# Patient Record
Sex: MALE | Race: WHITE | Employment: OTHER | URBAN - METROPOLITAN AREA
[De-identification: names, ages, dates, MRNs, and addresses within clinical notes are randomized per-mention and may not be internally consistent; named-entity substitution may affect disease eponyms.]

---

## 2017-01-01 ENCOUNTER — HOSPITAL ENCOUNTER (OUTPATIENT)
Dept: LAB | Age: 82
Discharge: HOME | End: 2017-07-05
Payer: MEDICARE

## 2017-01-01 ENCOUNTER — ANESTHESIA EVENT (OUTPATIENT)
Dept: SURGERY | Age: 82
DRG: 266 | End: 2017-01-01
Payer: MEDICARE

## 2017-01-01 ENCOUNTER — HOSPITAL ENCOUNTER (OUTPATIENT)
Dept: LAB | Age: 82
Discharge: HOME | End: 2017-04-20
Attending: INTERNAL MEDICINE
Payer: MEDICARE

## 2017-01-01 ENCOUNTER — PATIENT OUTREACH (OUTPATIENT)
Dept: CASE MANAGEMENT | Age: 82
End: 2017-01-01

## 2017-01-01 ENCOUNTER — HOSPITAL ENCOUNTER (INPATIENT)
Age: 82
LOS: 3 days | Discharge: HOME | DRG: 266 | End: 2017-06-29
Attending: INTERNAL MEDICINE | Admitting: INTERNAL MEDICINE
Payer: MEDICARE

## 2017-01-01 ENCOUNTER — HOSPITAL ENCOUNTER (OUTPATIENT)
Age: 82
Setting detail: OBSERVATION
Discharge: HOME | DRG: 307 | End: 2017-05-04
Attending: INTERNAL MEDICINE | Admitting: INTERNAL MEDICINE
Payer: MEDICARE

## 2017-01-01 ENCOUNTER — HOSPITAL ENCOUNTER (OUTPATIENT)
Dept: CARDIAC CATH/INVASIVE PROCEDURES | Age: 82
Discharge: HOME | End: 2017-02-07
Attending: INTERNAL MEDICINE | Admitting: INTERNAL MEDICINE
Payer: MEDICARE

## 2017-01-01 ENCOUNTER — ANESTHESIA (OUTPATIENT)
Dept: SURGERY | Age: 82
DRG: 266 | End: 2017-01-01
Payer: MEDICARE

## 2017-01-01 ENCOUNTER — HOSPITAL ENCOUNTER (OUTPATIENT)
Dept: LAB | Age: 82
Discharge: HOME | End: 2017-07-26
Payer: MEDICARE

## 2017-01-01 ENCOUNTER — APPOINTMENT (OUTPATIENT)
Dept: ULTRASOUND IMAGING | Age: 82
DRG: 266 | End: 2017-01-01
Attending: INTERNAL MEDICINE
Payer: MEDICARE

## 2017-01-01 ENCOUNTER — APPOINTMENT (OUTPATIENT)
Dept: GENERAL RADIOLOGY | Age: 82
DRG: 266 | End: 2017-01-01
Payer: MEDICARE

## 2017-01-01 VITALS
HEART RATE: 89 BPM | BODY MASS INDEX: 28.72 KG/M2 | HEIGHT: 67 IN | DIASTOLIC BLOOD PRESSURE: 111 MMHG | TEMPERATURE: 97.5 F | SYSTOLIC BLOOD PRESSURE: 156 MMHG | WEIGHT: 183 LBS | RESPIRATION RATE: 16 BRPM | OXYGEN SATURATION: 98 %

## 2017-01-01 VITALS
BODY MASS INDEX: 31.15 KG/M2 | RESPIRATION RATE: 16 BRPM | DIASTOLIC BLOOD PRESSURE: 69 MMHG | HEART RATE: 65 BPM | WEIGHT: 198.85 LBS | SYSTOLIC BLOOD PRESSURE: 123 MMHG | TEMPERATURE: 98.1 F | OXYGEN SATURATION: 93 %

## 2017-01-01 VITALS
WEIGHT: 187.9 LBS | DIASTOLIC BLOOD PRESSURE: 90 MMHG | OXYGEN SATURATION: 96 % | TEMPERATURE: 97 F | RESPIRATION RATE: 18 BRPM | SYSTOLIC BLOOD PRESSURE: 164 MMHG | HEIGHT: 68 IN | HEART RATE: 59 BPM | BODY MASS INDEX: 28.48 KG/M2

## 2017-01-01 DIAGNOSIS — I35.0 AORTIC VALVE STENOSIS, UNSPECIFIED ETIOLOGY: ICD-10-CM

## 2017-01-01 DIAGNOSIS — I35.0 AORTIC VALVE STENOSIS, UNSPECIFIED ETIOLOGY: Primary | ICD-10-CM

## 2017-01-01 DIAGNOSIS — I25.10 ATHEROSCLEROSIS OF NATIVE CORONARY ARTERY OF NATIVE HEART WITHOUT ANGINA PECTORIS: ICD-10-CM

## 2017-01-01 LAB
ABO + RH BLD: NORMAL
ALBUMIN SERPL BCP-MCNC: 3.1 G/DL (ref 3.2–4.6)
ALBUMIN SERPL BCP-MCNC: 3.5 G/DL (ref 3.2–4.6)
ALBUMIN SERPL BCP-MCNC: 3.6 G/DL (ref 3.2–4.6)
ALBUMIN/GLOB SERPL: 0.9 {RATIO} (ref 1.2–3.5)
ALBUMIN/GLOB SERPL: 1 {RATIO} (ref 1.2–3.5)
ALP SERPL-CCNC: 89 U/L (ref 50–136)
ALP SERPL-CCNC: 96 U/L (ref 50–136)
ALT SERPL-CCNC: 26 U/L (ref 12–65)
ALT SERPL-CCNC: 30 U/L (ref 12–65)
ANION GAP BLD CALC-SCNC: 11 MMOL/L (ref 7–16)
ANION GAP BLD CALC-SCNC: 11 MMOL/L (ref 7–16)
ANION GAP BLD CALC-SCNC: 12 MMOL/L (ref 7–16)
ANION GAP BLD CALC-SCNC: 6 MMOL/L
ANION GAP BLD CALC-SCNC: 6 MMOL/L (ref 7–16)
ANION GAP BLD CALC-SCNC: 7 MMOL/L
ANION GAP BLD CALC-SCNC: 7 MMOL/L
ANION GAP BLD CALC-SCNC: 8 MMOL/L (ref 7–16)
ANION GAP BLD CALC-SCNC: 9 MMOL/L (ref 7–16)
APPEARANCE UR: CLEAR
AST SERPL W P-5'-P-CCNC: 18 U/L (ref 15–37)
AST SERPL W P-5'-P-CCNC: 21 U/L (ref 15–37)
ATRIAL RATE: 100 BPM
ATRIAL RATE: 144 BPM
ATRIAL RATE: 60 BPM
ATRIAL RATE: 61 BPM
ATRIAL RATE: 78 BPM
BACTERIA SPEC CULT: NORMAL
BACTERIA SPEC CULT: NORMAL
BACTERIA URNS QL MICRO: 0 /HPF
BASE DEFICIT BLD-SCNC: 3 MMOL/L
BASE DEFICIT BLD-SCNC: 4 MMOL/L
BASE EXCESS BLD CALC-SCNC: 0 MMOL/L
BASOPHILS # BLD AUTO: 0 K/UL (ref 0–0.2)
BASOPHILS # BLD AUTO: 0.1 K/UL (ref 0–0.2)
BASOPHILS # BLD: 1 % (ref 0–2)
BASOPHILS # BLD: 1 % (ref 0–2)
BILIRUB SERPL-MCNC: 0.6 MG/DL (ref 0.2–1.1)
BILIRUB UR QL: NEGATIVE
BLD PROD TYP BPU: NORMAL
BLOOD GROUP ANTIBODIES SERPL: NORMAL
BNP SERPL-MCNC: 288 PG/ML
BPU ID: NORMAL
BUN SERPL-MCNC: 29 MG/DL (ref 8–23)
BUN SERPL-MCNC: 30 MG/DL (ref 8–23)
BUN SERPL-MCNC: 32 MG/DL (ref 8–23)
BUN SERPL-MCNC: 35 MG/DL (ref 8–23)
BUN SERPL-MCNC: 36 MG/DL (ref 8–23)
BUN SERPL-MCNC: 36 MG/DL (ref 8–23)
BUN SERPL-MCNC: 37 MG/DL (ref 8–23)
BUN SERPL-MCNC: 40 MG/DL (ref 8–23)
BUN SERPL-MCNC: 43 MG/DL (ref 8–23)
CA-I BLD-MCNC: 1.14 MMOL/L (ref 1.12–1.32)
CA-I BLD-MCNC: 1.14 MMOL/L (ref 1.12–1.32)
CA-I BLD-MCNC: 1.15 MMOL/L (ref 1.12–1.32)
CALCIUM SERPL-MCNC: 8 MG/DL (ref 8.3–10.4)
CALCIUM SERPL-MCNC: 8.1 MG/DL (ref 8.3–10.4)
CALCIUM SERPL-MCNC: 8.2 MG/DL (ref 8.3–10.4)
CALCIUM SERPL-MCNC: 8.4 MG/DL (ref 8.3–10.4)
CALCIUM SERPL-MCNC: 8.5 MG/DL (ref 8.3–10.4)
CALCIUM SERPL-MCNC: 8.5 MG/DL (ref 8.3–10.4)
CALCIUM SERPL-MCNC: 8.6 MG/DL (ref 8.3–10.4)
CALCIUM SERPL-MCNC: 8.6 MG/DL (ref 8.3–10.4)
CALCIUM SERPL-MCNC: 8.8 MG/DL (ref 8.3–10.4)
CALCIUM SERPL-MCNC: 8.9 MG/DL (ref 8.3–10.4)
CALCIUM SERPL-MCNC: 9.1 MG/DL (ref 8.3–10.4)
CALCULATED P AXIS, ECG09: 15 DEGREES
CALCULATED P AXIS, ECG09: 23 DEGREES
CALCULATED P AXIS, ECG09: 43 DEGREES
CALCULATED P AXIS, ECG09: 86 DEGREES
CALCULATED R AXIS, ECG10: -53 DEGREES
CALCULATED R AXIS, ECG10: -55 DEGREES
CALCULATED R AXIS, ECG10: -57 DEGREES
CALCULATED R AXIS, ECG10: -60 DEGREES
CALCULATED R AXIS, ECG10: 3 DEGREES
CALCULATED T AXIS, ECG11: -114 DEGREES
CALCULATED T AXIS, ECG11: -2 DEGREES
CALCULATED T AXIS, ECG11: 43 DEGREES
CALCULATED T AXIS, ECG11: 65 DEGREES
CALCULATED T AXIS, ECG11: 71 DEGREES
CHLORIDE SERPL-SCNC: 103 MMOL/L (ref 98–107)
CHLORIDE SERPL-SCNC: 104 MMOL/L (ref 98–107)
CHLORIDE SERPL-SCNC: 105 MMOL/L (ref 98–107)
CHLORIDE SERPL-SCNC: 106 MMOL/L (ref 98–107)
CHLORIDE SERPL-SCNC: 106 MMOL/L (ref 98–107)
CHLORIDE SERPL-SCNC: 107 MMOL/L (ref 98–107)
CHLORIDE SERPL-SCNC: 107 MMOL/L (ref 98–107)
CHLORIDE SERPL-SCNC: 108 MMOL/L (ref 98–107)
CHLORIDE SERPL-SCNC: 108 MMOL/L (ref 98–107)
CHLORIDE SERPL-SCNC: 109 MMOL/L (ref 98–107)
CHLORIDE SERPL-SCNC: 110 MMOL/L (ref 98–107)
CO2 SERPL-SCNC: 23 MMOL/L (ref 21–32)
CO2 SERPL-SCNC: 24 MMOL/L (ref 21–32)
CO2 SERPL-SCNC: 25 MMOL/L (ref 21–32)
CO2 SERPL-SCNC: 25 MMOL/L (ref 21–32)
CO2 SERPL-SCNC: 27 MMOL/L (ref 21–32)
CO2 SERPL-SCNC: 27 MMOL/L (ref 21–32)
CO2 SERPL-SCNC: 28 MMOL/L (ref 21–32)
CO2 SERPL-SCNC: 28 MMOL/L (ref 21–32)
CO2 SERPL-SCNC: 29 MMOL/L (ref 21–32)
CO2 SERPL-SCNC: 29 MMOL/L (ref 21–32)
CO2 SERPL-SCNC: 30 MMOL/L (ref 21–32)
COLOR UR: YELLOW
CREAT SERPL-MCNC: 2.36 MG/DL (ref 0.8–1.5)
CREAT SERPL-MCNC: 2.43 MG/DL (ref 0.8–1.5)
CREAT SERPL-MCNC: 2.5 MG/DL (ref 0.8–1.5)
CREAT SERPL-MCNC: 2.61 MG/DL (ref 0.8–1.5)
CREAT SERPL-MCNC: 2.61 MG/DL (ref 0.8–1.5)
CREAT SERPL-MCNC: 2.64 MG/DL (ref 0.8–1.5)
CREAT SERPL-MCNC: 2.64 MG/DL (ref 0.8–1.5)
CREAT SERPL-MCNC: 2.66 MG/DL (ref 0.8–1.5)
CREAT SERPL-MCNC: 2.7 MG/DL (ref 0.8–1.5)
CREAT SERPL-MCNC: 2.8 MG/DL (ref 0.8–1.5)
CREAT SERPL-MCNC: 2.83 MG/DL (ref 0.8–1.5)
CROSSMATCH RESULT,%XM: NORMAL
DIAGNOSIS, 93000: NORMAL
DIASTOLIC BP, ECG02: NORMAL MMHG
DIASTOLIC BP, ECG02: NORMAL MMHG
DIFFERENTIAL METHOD BLD: ABNORMAL
DIFFERENTIAL METHOD BLD: ABNORMAL
EOSINOPHIL # BLD: 0 K/UL (ref 0–0.8)
EOSINOPHIL # BLD: 0.1 K/UL (ref 0–0.8)
EOSINOPHIL NFR BLD: 1 % (ref 0.5–7.8)
EOSINOPHIL NFR BLD: 1 % (ref 0.5–7.8)
ERYTHROCYTE [DISTWIDTH] IN BLOOD BY AUTOMATED COUNT: 14.6 % (ref 11.9–14.6)
ERYTHROCYTE [DISTWIDTH] IN BLOOD BY AUTOMATED COUNT: 14.9 % (ref 11.9–14.6)
ERYTHROCYTE [DISTWIDTH] IN BLOOD BY AUTOMATED COUNT: 15.1 % (ref 11.9–14.6)
ERYTHROCYTE [DISTWIDTH] IN BLOOD BY AUTOMATED COUNT: 15.3 % (ref 11.9–14.6)
ERYTHROCYTE [DISTWIDTH] IN BLOOD BY AUTOMATED COUNT: 15.3 % (ref 11.9–14.6)
ERYTHROCYTE [DISTWIDTH] IN BLOOD BY AUTOMATED COUNT: 15.4 % (ref 11.9–14.6)
ERYTHROCYTE [DISTWIDTH] IN BLOOD BY AUTOMATED COUNT: 15.5 % (ref 11.9–14.6)
ERYTHROCYTE [DISTWIDTH] IN BLOOD BY AUTOMATED COUNT: 16.1 % (ref 11.9–14.6)
EST. AVERAGE GLUCOSE BLD GHB EST-MCNC: 103 MG/DL
GLOBULIN SER CALC-MCNC: 3.4 G/DL (ref 2.3–3.5)
GLOBULIN SER CALC-MCNC: 3.7 G/DL (ref 2.3–3.5)
GLUCOSE BLD STRIP.AUTO-MCNC: 106 MG/DL (ref 70–105)
GLUCOSE BLD STRIP.AUTO-MCNC: 114 MG/DL (ref 70–105)
GLUCOSE BLD STRIP.AUTO-MCNC: 119 MG/DL (ref 70–105)
GLUCOSE SERPL-MCNC: 106 MG/DL (ref 65–100)
GLUCOSE SERPL-MCNC: 110 MG/DL (ref 65–100)
GLUCOSE SERPL-MCNC: 116 MG/DL (ref 65–100)
GLUCOSE SERPL-MCNC: 89 MG/DL (ref 65–100)
GLUCOSE SERPL-MCNC: 89 MG/DL (ref 65–100)
GLUCOSE SERPL-MCNC: 91 MG/DL (ref 65–100)
GLUCOSE SERPL-MCNC: 92 MG/DL (ref 65–100)
GLUCOSE SERPL-MCNC: 92 MG/DL (ref 65–100)
GLUCOSE SERPL-MCNC: 93 MG/DL (ref 65–100)
GLUCOSE SERPL-MCNC: 97 MG/DL (ref 65–100)
GLUCOSE SERPL-MCNC: 99 MG/DL (ref 65–100)
GLUCOSE UR STRIP.AUTO-MCNC: NEGATIVE MG/DL
HBA1C MFR BLD: 5.2 % (ref 4.8–6)
HCO3 BLD-SCNC: 22.2 MMOL/L (ref 22–26)
HCO3 BLD-SCNC: 22.9 MMOL/L (ref 22–26)
HCO3 BLD-SCNC: 24.2 MMOL/L (ref 22–26)
HCT VFR BLD AUTO: 32 % (ref 41.1–50.3)
HCT VFR BLD AUTO: 34 % (ref 41.1–50.3)
HCT VFR BLD AUTO: 34.3 % (ref 41.1–50.3)
HCT VFR BLD AUTO: 35.7 % (ref 41.1–50.3)
HCT VFR BLD AUTO: 36.3 % (ref 41.1–50.3)
HCT VFR BLD AUTO: 37.1 % (ref 41.1–50.3)
HCT VFR BLD AUTO: 37.2 % (ref 41.1–50.3)
HCT VFR BLD AUTO: 41.9 % (ref 41.1–50.3)
HGB BLD-MCNC: 10.9 G/DL (ref 13.6–17.2)
HGB BLD-MCNC: 11.3 G/DL (ref 13.6–17.2)
HGB BLD-MCNC: 11.8 G/DL (ref 13.6–17.2)
HGB BLD-MCNC: 12 G/DL (ref 13.6–17.2)
HGB BLD-MCNC: 12.1 G/DL (ref 13.6–17.2)
HGB BLD-MCNC: 12.5 G/DL (ref 13.6–17.2)
HGB BLD-MCNC: 12.5 G/DL (ref 13.6–17.2)
HGB BLD-MCNC: 14.2 G/DL (ref 13.6–17.2)
HGB UR QL STRIP: NEGATIVE
IMM GRANULOCYTES # BLD: 0.1 K/UL (ref 0–0.5)
IMM GRANULOCYTES NFR BLD AUTO: 1.1 % (ref 0–5)
INR PPP: 1.1 (ref 0.9–1.2)
INR PPP: 1.4 (ref 0.9–1.2)
INR PPP: 1.7 (ref 0.9–1.2)
KETONES UR QL STRIP.AUTO: NEGATIVE MG/DL
LEUKOCYTE ESTERASE UR QL STRIP.AUTO: NEGATIVE
LYMPHOCYTES # BLD AUTO: 22 % (ref 13–44)
LYMPHOCYTES # BLD AUTO: 27 % (ref 13–44)
LYMPHOCYTES # BLD: 1.3 K/UL (ref 0.5–4.6)
LYMPHOCYTES # BLD: 1.4 K/UL (ref 0.5–4.6)
MAGNESIUM SERPL-MCNC: 2.3 MG/DL (ref 1.8–2.4)
MAGNESIUM SERPL-MCNC: 2.4 MG/DL (ref 1.8–2.4)
MCH RBC QN AUTO: 31.3 PG (ref 26.1–32.9)
MCH RBC QN AUTO: 31.8 PG (ref 26.1–32.9)
MCH RBC QN AUTO: 31.9 PG (ref 26.1–32.9)
MCH RBC QN AUTO: 32.3 PG (ref 26.1–32.9)
MCH RBC QN AUTO: 32.4 PG (ref 26.1–32.9)
MCH RBC QN AUTO: 32.5 PG (ref 26.1–32.9)
MCH RBC QN AUTO: 32.6 PG (ref 26.1–32.9)
MCH RBC QN AUTO: 33.1 PG (ref 26.1–32.9)
MCHC RBC AUTO-ENTMCNC: 32.9 G/DL (ref 31.4–35)
MCHC RBC AUTO-ENTMCNC: 33.3 G/DL (ref 31.4–35)
MCHC RBC AUTO-ENTMCNC: 33.6 G/DL (ref 31.4–35)
MCHC RBC AUTO-ENTMCNC: 33.6 G/DL (ref 31.4–35)
MCHC RBC AUTO-ENTMCNC: 33.7 G/DL (ref 31.4–35)
MCHC RBC AUTO-ENTMCNC: 33.9 G/DL (ref 31.4–35)
MCHC RBC AUTO-ENTMCNC: 34.1 G/DL (ref 31.4–35)
MCHC RBC AUTO-ENTMCNC: 34.7 G/DL (ref 31.4–35)
MCV RBC AUTO: 93.4 FL (ref 79.6–97.8)
MCV RBC AUTO: 94.6 FL (ref 79.6–97.8)
MCV RBC AUTO: 95 FL (ref 79.6–97.8)
MCV RBC AUTO: 95.5 FL (ref 79.6–97.8)
MCV RBC AUTO: 95.8 FL (ref 79.6–97.8)
MCV RBC AUTO: 95.9 FL (ref 79.6–97.8)
MCV RBC AUTO: 96.1 FL (ref 79.6–97.8)
MCV RBC AUTO: 98.6 FL (ref 79.6–97.8)
MONOCYTES # BLD: 0.5 K/UL (ref 0.1–1.3)
MONOCYTES # BLD: 0.8 K/UL (ref 0.1–1.3)
MONOCYTES NFR BLD AUTO: 11 % (ref 4–12)
MONOCYTES NFR BLD AUTO: 13 % (ref 4–12)
NEUTS SEG # BLD: 2.7 K/UL (ref 1.7–8.2)
NEUTS SEG # BLD: 4 K/UL (ref 1.7–8.2)
NEUTS SEG NFR BLD AUTO: 59 % (ref 43–78)
NEUTS SEG NFR BLD AUTO: 63 % (ref 43–78)
NITRITE UR QL STRIP.AUTO: NEGATIVE
P-R INTERVAL, ECG05: 226 MS
P-R INTERVAL, ECG05: 238 MS
P-R INTERVAL, ECG05: NORMAL MS
P-R INTERVAL, ECG05: NORMAL MS
PCO2 BLD: 38.2 MMHG (ref 35–45)
PCO2 BLD: 42 MMHG (ref 35–45)
PCO2 BLD: 45.5 MMHG (ref 35–45)
PH BLD: 7.29 [PH] (ref 7.35–7.45)
PH BLD: 7.34 [PH] (ref 7.35–7.45)
PH BLD: 7.41 [PH] (ref 7.35–7.45)
PH UR STRIP: 5.5 [PH] (ref 5–9)
PLATELET # BLD AUTO: 139 K/UL (ref 150–450)
PLATELET # BLD AUTO: 160 K/UL (ref 150–450)
PLATELET # BLD AUTO: 162 K/UL (ref 150–450)
PLATELET # BLD AUTO: 184 K/UL (ref 150–450)
PLATELET # BLD AUTO: 212 K/UL (ref 150–450)
PLATELET # BLD AUTO: 217 K/UL (ref 150–450)
PLATELET # BLD AUTO: 219 K/UL (ref 150–450)
PLATELET # BLD AUTO: 271 K/UL (ref 150–450)
PLATELET COMMENTS,PCOM: ADEQUATE
PMV BLD AUTO: 10.6 FL (ref 10.8–14.1)
PMV BLD AUTO: 8.8 FL (ref 10.8–14.1)
PMV BLD AUTO: 9.4 FL (ref 10.8–14.1)
PMV BLD AUTO: 9.5 FL (ref 10.8–14.1)
PMV BLD AUTO: 9.5 FL (ref 10.8–14.1)
PMV BLD AUTO: 9.6 FL (ref 10.8–14.1)
PMV BLD AUTO: 9.7 FL (ref 10.8–14.1)
PMV BLD AUTO: 9.7 FL (ref 10.8–14.1)
PO2 BLD: 148 MMHG (ref 80–105)
PO2 BLD: 180 MMHG (ref 80–105)
PO2 BLD: 390 MMHG (ref 80–105)
POTASSIUM BLD-SCNC: 4.4 MMOL/L (ref 3.5–5.1)
POTASSIUM BLD-SCNC: 4.5 MMOL/L (ref 3.5–5.1)
POTASSIUM BLD-SCNC: 4.6 MMOL/L (ref 3.5–5.1)
POTASSIUM SERPL-SCNC: 3.9 MMOL/L (ref 3.5–5.1)
POTASSIUM SERPL-SCNC: 4.1 MMOL/L (ref 3.5–5.1)
POTASSIUM SERPL-SCNC: 4.2 MMOL/L (ref 3.5–5.1)
POTASSIUM SERPL-SCNC: 4.3 MMOL/L (ref 3.5–5.1)
POTASSIUM SERPL-SCNC: 4.4 MMOL/L (ref 3.5–5.1)
POTASSIUM SERPL-SCNC: 4.5 MMOL/L (ref 3.5–5.1)
POTASSIUM SERPL-SCNC: 4.5 MMOL/L (ref 3.5–5.1)
POTASSIUM SERPL-SCNC: 4.6 MMOL/L (ref 3.5–5.1)
POTASSIUM SERPL-SCNC: 4.7 MMOL/L (ref 3.5–5.1)
PROT SERPL-MCNC: 6.5 G/DL (ref 6.3–8.2)
PROT SERPL-MCNC: 7.3 G/DL (ref 6.3–8.2)
PROT UR STRIP-MCNC: NEGATIVE MG/DL
PROTHROMBIN TIME: 11.5 SEC (ref 9.6–12)
PROTHROMBIN TIME: 15 SEC (ref 9.6–12)
PROTHROMBIN TIME: 17.6 SEC (ref 9.6–12)
Q-T INTERVAL, ECG07: 302 MS
Q-T INTERVAL, ECG07: 384 MS
Q-T INTERVAL, ECG07: 490 MS
Q-T INTERVAL, ECG07: 494 MS
Q-T INTERVAL, ECG07: 516 MS
QRS DURATION, ECG06: 114 MS
QRS DURATION, ECG06: 118 MS
QRS DURATION, ECG06: 132 MS
QRS DURATION, ECG06: 138 MS
QRS DURATION, ECG06: 158 MS
QTC CALCULATION (BEZET), ECG08: 437 MS
QTC CALCULATION (BEZET), ECG08: 467 MS
QTC CALCULATION (BEZET), ECG08: 490 MS
QTC CALCULATION (BEZET), ECG08: 497 MS
QTC CALCULATION (BEZET), ECG08: 576 MS
RBC # BLD AUTO: 3.34 M/UL (ref 4.23–5.67)
RBC # BLD AUTO: 3.61 M/UL (ref 4.23–5.67)
RBC # BLD AUTO: 3.62 M/UL (ref 4.23–5.67)
RBC # BLD AUTO: 3.64 M/UL (ref 4.23–5.67)
RBC # BLD AUTO: 3.8 M/UL (ref 4.23–5.67)
RBC # BLD AUTO: 3.87 M/UL (ref 4.23–5.67)
RBC # BLD AUTO: 3.92 M/UL (ref 4.23–5.67)
RBC # BLD AUTO: 4.37 M/UL (ref 4.23–5.67)
RBC MORPH BLD: ABNORMAL
SAO2 % BLD: 100 % (ref 95–98)
SAO2 % BLD: 100 % (ref 95–98)
SAO2 % BLD: 99 % (ref 95–98)
SERVICE CMNT-IMP: NORMAL
SERVICE CMNT-IMP: NORMAL
SODIUM BLD-SCNC: 140 MMOL/L (ref 138–146)
SODIUM BLD-SCNC: 140 MMOL/L (ref 138–146)
SODIUM BLD-SCNC: 141 MMOL/L (ref 138–146)
SODIUM SERPL-SCNC: 139 MMOL/L (ref 136–145)
SODIUM SERPL-SCNC: 140 MMOL/L (ref 136–145)
SODIUM SERPL-SCNC: 140 MMOL/L (ref 136–145)
SODIUM SERPL-SCNC: 142 MMOL/L (ref 136–145)
SODIUM SERPL-SCNC: 143 MMOL/L (ref 136–145)
SODIUM SERPL-SCNC: 144 MMOL/L (ref 136–145)
SODIUM SERPL-SCNC: 145 MMOL/L (ref 136–145)
SP GR UR REFRACTOMETRY: 1.01 (ref 1–1.02)
SPECIMEN EXP DATE BLD: NORMAL
STATUS OF UNIT,%ST: NORMAL
SYSTOLIC BP, ECG01: NORMAL MMHG
SYSTOLIC BP, ECG01: NORMAL MMHG
UNIT DIVISION, %UDIV: 0
UROBILINOGEN UR QL STRIP.AUTO: 0.2 EU/DL (ref 0.2–1)
VENTRICULAR RATE, ECG03: 126 BPM
VENTRICULAR RATE, ECG03: 60 BPM
VENTRICULAR RATE, ECG03: 61 BPM
VENTRICULAR RATE, ECG03: 75 BPM
VENTRICULAR RATE, ECG03: 89 BPM
WBC # BLD AUTO: 4.6 K/UL (ref 4.3–11.1)
WBC # BLD AUTO: 5.3 K/UL (ref 4.3–11.1)
WBC # BLD AUTO: 5.7 K/UL (ref 4.3–11.1)
WBC # BLD AUTO: 5.9 K/UL (ref 4.3–11.1)
WBC # BLD AUTO: 5.9 K/UL (ref 4.3–11.1)
WBC # BLD AUTO: 6.1 K/UL (ref 4.3–11.1)
WBC # BLD AUTO: 6.4 K/UL (ref 4.3–11.1)
WBC # BLD AUTO: 6.7 K/UL (ref 4.3–11.1)
WBC MORPH BLD: ABNORMAL

## 2017-01-01 PROCEDURE — 87086 URINE CULTURE/COLONY COUNT: CPT | Performed by: PHYSICIAN ASSISTANT

## 2017-01-01 PROCEDURE — 36415 COLL VENOUS BLD VENIPUNCTURE: CPT | Performed by: PHYSICIAN ASSISTANT

## 2017-01-01 PROCEDURE — 87641 MR-STAPH DNA AMP PROBE: CPT | Performed by: PHYSICIAN ASSISTANT

## 2017-01-01 PROCEDURE — 74011250637 HC RX REV CODE- 250/637: Performed by: PHYSICIAN ASSISTANT

## 2017-01-01 PROCEDURE — 77030019605

## 2017-01-01 PROCEDURE — 74011250636 HC RX REV CODE- 250/636

## 2017-01-01 PROCEDURE — 02RF38Z REPLACEMENT OF AORTIC VALVE WITH ZOOPLASTIC TISSUE, PERCUTANEOUS APPROACH: ICD-10-PCS | Performed by: INTERNAL MEDICINE

## 2017-01-01 PROCEDURE — 80048 BASIC METABOLIC PNL TOTAL CA: CPT | Performed by: INTERNAL MEDICINE

## 2017-01-01 PROCEDURE — 5A1223Z PERFORMANCE OF CARDIAC PACING, CONTINUOUS: ICD-10-PCS | Performed by: INTERNAL MEDICINE

## 2017-01-01 PROCEDURE — 85610 PROTHROMBIN TIME: CPT | Performed by: INTERNAL MEDICINE

## 2017-01-01 PROCEDURE — 93459 L HRT ART/GRFT ANGIO: CPT

## 2017-01-01 PROCEDURE — 76060000036 HC ANESTHESIA 2.5 TO 3 HR: Performed by: INTERNAL MEDICINE

## 2017-01-01 PROCEDURE — 85025 COMPLETE CBC W/AUTO DIFF WBC: CPT | Performed by: INTERNAL MEDICINE

## 2017-01-01 PROCEDURE — 85027 COMPLETE CBC AUTOMATED: CPT | Performed by: INTERNAL MEDICINE

## 2017-01-01 PROCEDURE — 77030008477 HC STYL SATN SLP COVD -A: Performed by: ANESTHESIOLOGY

## 2017-01-01 PROCEDURE — 77030016564 HC BLD STRNL SAW4 CNMD -B: Performed by: INTERNAL MEDICINE

## 2017-01-01 PROCEDURE — 77030002986 HC SUT PROL J&J -A: Performed by: INTERNAL MEDICINE

## 2017-01-01 PROCEDURE — C1894 INTRO/SHEATH, NON-LASER: HCPCS

## 2017-01-01 PROCEDURE — 74011250636 HC RX REV CODE- 250/636: Performed by: PHYSICIAN ASSISTANT

## 2017-01-01 PROCEDURE — 93005 ELECTROCARDIOGRAM TRACING: CPT | Performed by: INTERNAL MEDICINE

## 2017-01-01 PROCEDURE — 77030011640 HC PAD GRND REM COVD -A: Performed by: INTERNAL MEDICINE

## 2017-01-01 PROCEDURE — 76010000207 HC CV SURG 2.5 TO 3 HR INTENSV-TIER 1: Performed by: INTERNAL MEDICINE

## 2017-01-01 PROCEDURE — 36415 COLL VENOUS BLD VENIPUNCTURE: CPT | Performed by: NURSE PRACTITIONER

## 2017-01-01 PROCEDURE — 77030005537 HC CATH URETH BARD -A: Performed by: INTERNAL MEDICINE

## 2017-01-01 PROCEDURE — 80053 COMPREHEN METABOLIC PANEL: CPT | Performed by: NURSE PRACTITIONER

## 2017-01-01 PROCEDURE — 83735 ASSAY OF MAGNESIUM: CPT | Performed by: PHYSICIAN ASSISTANT

## 2017-01-01 PROCEDURE — 83036 HEMOGLOBIN GLYCOSYLATED A1C: CPT | Performed by: PHYSICIAN ASSISTANT

## 2017-01-01 PROCEDURE — 85027 COMPLETE CBC AUTOMATED: CPT | Performed by: PHYSICIAN ASSISTANT

## 2017-01-01 PROCEDURE — 80048 BASIC METABOLIC PNL TOTAL CA: CPT | Performed by: PHYSICIAN ASSISTANT

## 2017-01-01 PROCEDURE — 82040 ASSAY OF SERUM ALBUMIN: CPT | Performed by: PHYSICIAN ASSISTANT

## 2017-01-01 PROCEDURE — 83880 ASSAY OF NATRIURETIC PEPTIDE: CPT | Performed by: PHYSICIAN ASSISTANT

## 2017-01-01 PROCEDURE — 74011250636 HC RX REV CODE- 250/636: Performed by: NURSE PRACTITIONER

## 2017-01-01 PROCEDURE — 93312 ECHO TRANSESOPHAGEAL: CPT

## 2017-01-01 PROCEDURE — 86923 COMPATIBILITY TEST ELECTRIC: CPT | Performed by: PHYSICIAN ASSISTANT

## 2017-01-01 PROCEDURE — 80053 COMPREHEN METABOLIC PANEL: CPT | Performed by: PHYSICIAN ASSISTANT

## 2017-01-01 PROCEDURE — 77030012221 HC CATH FOL CRITCOR BARD -B: Performed by: INTERNAL MEDICINE

## 2017-01-01 PROCEDURE — 77030004534 HC CATH ANGI DX INFN CARD -A

## 2017-01-01 PROCEDURE — C8929 TTE W OR WO FOL WCON,DOPPLER: HCPCS

## 2017-01-01 PROCEDURE — 80048 BASIC METABOLIC PNL TOTAL CA: CPT | Performed by: NURSE PRACTITIONER

## 2017-01-01 PROCEDURE — 77010033678 HC OXYGEN DAILY

## 2017-01-01 PROCEDURE — 77030013687 HC GD NDL BARD -B

## 2017-01-01 PROCEDURE — 74011000258 HC RX REV CODE- 258

## 2017-01-01 PROCEDURE — 86900 BLOOD TYPING SEROLOGIC ABO: CPT | Performed by: PHYSICIAN ASSISTANT

## 2017-01-01 PROCEDURE — 36415 COLL VENOUS BLD VENIPUNCTURE: CPT | Performed by: INTERNAL MEDICINE

## 2017-01-01 PROCEDURE — 74011250637 HC RX REV CODE- 250/637: Performed by: NURSE PRACTITIONER

## 2017-01-01 PROCEDURE — C1760 CLOSURE DEV, VASC: HCPCS

## 2017-01-01 PROCEDURE — 36600 WITHDRAWAL OF ARTERIAL BLOOD: CPT

## 2017-01-01 PROCEDURE — 85027 COMPLETE CBC AUTOMATED: CPT | Performed by: NURSE PRACTITIONER

## 2017-01-01 PROCEDURE — 85610 PROTHROMBIN TIME: CPT | Performed by: PHYSICIAN ASSISTANT

## 2017-01-01 PROCEDURE — 99153 MOD SED SAME PHYS/QHP EA: CPT

## 2017-01-01 PROCEDURE — C1769 GUIDE WIRE: HCPCS

## 2017-01-01 PROCEDURE — 74011250636 HC RX REV CODE- 250/636: Performed by: INTERNAL MEDICINE

## 2017-01-01 PROCEDURE — 65660000004 HC RM CVT STEPDOWN

## 2017-01-01 PROCEDURE — 81003 URINALYSIS AUTO W/O SCOPE: CPT | Performed by: PHYSICIAN ASSISTANT

## 2017-01-01 PROCEDURE — 77030019940 HC BLNKT HYPOTHRM STRY -B: Performed by: ANESTHESIOLOGY

## 2017-01-01 PROCEDURE — 93005 ELECTROCARDIOGRAM TRACING: CPT | Performed by: NURSE PRACTITIONER

## 2017-01-01 PROCEDURE — 77030004558 HC CATH ANGI DX SUPR TORQ CARD -A

## 2017-01-01 PROCEDURE — 77030031139 HC SUT VCRL2 J&J -A: Performed by: INTERNAL MEDICINE

## 2017-01-01 PROCEDURE — 77030037468 HC VLV AORT EDWRD -L: Performed by: INTERNAL MEDICINE

## 2017-01-01 PROCEDURE — 74011250637 HC RX REV CODE- 250/637: Performed by: INTERNAL MEDICINE

## 2017-01-01 PROCEDURE — 77030004559 HC CATH ANGI DX SUPT CARD -B

## 2017-01-01 PROCEDURE — 82247 BILIRUBIN TOTAL: CPT | Performed by: PHYSICIAN ASSISTANT

## 2017-01-01 PROCEDURE — 74011000250 HC RX REV CODE- 250: Performed by: INTERNAL MEDICINE

## 2017-01-01 PROCEDURE — 74011636320 HC RX REV CODE- 636/320: Performed by: INTERNAL MEDICINE

## 2017-01-01 PROCEDURE — 83735 ASSAY OF MAGNESIUM: CPT | Performed by: INTERNAL MEDICINE

## 2017-01-01 PROCEDURE — 77030013292 HC BOWL MX PRSM J&J -A: Performed by: ANESTHESIOLOGY

## 2017-01-01 PROCEDURE — 93880 EXTRACRANIAL BILAT STUDY: CPT

## 2017-01-01 PROCEDURE — 82803 BLOOD GASES ANY COMBINATION: CPT

## 2017-01-01 PROCEDURE — 65610000006 HC RM INTENSIVE CARE

## 2017-01-01 PROCEDURE — 85025 COMPLETE CBC W/AUTO DIFF WBC: CPT | Performed by: PHYSICIAN ASSISTANT

## 2017-01-01 PROCEDURE — 99218 HC RM OBSERVATION: CPT

## 2017-01-01 PROCEDURE — B24BZZ4 ULTRASONOGRAPHY OF HEART WITH AORTA, TRANSESOPHAGEAL: ICD-10-PCS | Performed by: INTERNAL MEDICINE

## 2017-01-01 PROCEDURE — 94729 DIFFUSING CAPACITY: CPT

## 2017-01-01 PROCEDURE — 74011000250 HC RX REV CODE- 250

## 2017-01-01 PROCEDURE — 99152 MOD SED SAME PHYS/QHP 5/>YRS: CPT

## 2017-01-01 PROCEDURE — 77030020407 HC IV BLD WRMR ST 3M -A: Performed by: ANESTHESIOLOGY

## 2017-01-01 PROCEDURE — 65660000000 HC RM CCU STEPDOWN

## 2017-01-01 PROCEDURE — 77030005401 HC CATH RAD ARRO -A: Performed by: ANESTHESIOLOGY

## 2017-01-01 PROCEDURE — 71010 XR CHEST PORT: CPT

## 2017-01-01 PROCEDURE — C1769 GUIDE WIRE: HCPCS | Performed by: INTERNAL MEDICINE

## 2017-01-01 PROCEDURE — 77030008703 HC TU ET UNCUF COVD -A: Performed by: ANESTHESIOLOGY

## 2017-01-01 PROCEDURE — 77030019908 HC STETH ESOPH SIMS -A: Performed by: ANESTHESIOLOGY

## 2017-01-01 PROCEDURE — 77030018548 HC SUT ETHBND2 J&J -B: Performed by: INTERNAL MEDICINE

## 2017-01-01 PROCEDURE — 93005 ELECTROCARDIOGRAM TRACING: CPT | Performed by: PHYSICIAN ASSISTANT

## 2017-01-01 PROCEDURE — B3101ZZ FLUOROSCOPY OF THORACIC AORTA USING LOW OSMOLAR CONTRAST: ICD-10-PCS | Performed by: INTERNAL MEDICINE

## 2017-01-01 PROCEDURE — 82947 ASSAY GLUCOSE BLOOD QUANT: CPT

## 2017-01-01 PROCEDURE — 77030018836 HC SOL IRR NACL ICUM -A: Performed by: INTERNAL MEDICINE

## 2017-01-01 PROCEDURE — 77030013794 HC KT TRNSDUC BLD EDWD -B: Performed by: ANESTHESIOLOGY

## 2017-01-01 PROCEDURE — 77030005318 HC CATH ELECTRD PACE TMP BARD -C

## 2017-01-01 PROCEDURE — 94010 BREATHING CAPACITY TEST: CPT

## 2017-01-01 PROCEDURE — 65390000012 HC CONDITION CODE 44 OBSERVATION

## 2017-01-01 DEVICE — VALVE AORTIC SAPEIN 3 26MM -- COMMANDER SYS 9600TFX: Type: IMPLANTABLE DEVICE | Site: AORTIC VALVE | Status: FUNCTIONAL

## 2017-01-01 RX ORDER — AMIODARONE HYDROCHLORIDE 200 MG/1
200 TABLET ORAL DAILY
Status: DISCONTINUED | OUTPATIENT
Start: 2017-01-01 | End: 2017-01-01 | Stop reason: HOSPADM

## 2017-01-01 RX ORDER — METOPROLOL TARTRATE 5 MG/5ML
5 INJECTION INTRAVENOUS ONCE
Status: CANCELLED | OUTPATIENT
Start: 2017-01-01 | End: 2017-01-01

## 2017-01-01 RX ORDER — HEPARIN SODIUM 1000 [USP'U]/ML
INJECTION, SOLUTION INTRAVENOUS; SUBCUTANEOUS AS NEEDED
Status: DISCONTINUED | OUTPATIENT
Start: 2017-01-01 | End: 2017-01-01 | Stop reason: HOSPADM

## 2017-01-01 RX ORDER — AMIODARONE HYDROCHLORIDE 200 MG/1
200 TABLET ORAL DAILY
Status: DISCONTINUED | OUTPATIENT
Start: 2017-01-01 | End: 2017-01-01

## 2017-01-01 RX ORDER — TAMSULOSIN HYDROCHLORIDE 0.4 MG/1
0.4 CAPSULE ORAL DAILY
Status: DISCONTINUED | OUTPATIENT
Start: 2017-01-01 | End: 2017-01-01

## 2017-01-01 RX ORDER — LIDOCAINE HYDROCHLORIDE 20 MG/ML
1-20 INJECTION, SOLUTION INFILTRATION; PERINEURAL
Status: DISCONTINUED | OUTPATIENT
Start: 2017-01-01 | End: 2017-01-01

## 2017-01-01 RX ORDER — NITROGLYCERIN 0.4 MG/1
0.4 TABLET SUBLINGUAL
Status: DISCONTINUED | OUTPATIENT
Start: 2017-01-01 | End: 2017-01-01 | Stop reason: HOSPADM

## 2017-01-01 RX ORDER — SODIUM CHLORIDE 0.9 % (FLUSH) 0.9 %
5-10 SYRINGE (ML) INJECTION EVERY 8 HOURS
Status: DISCONTINUED | OUTPATIENT
Start: 2017-01-01 | End: 2017-01-01 | Stop reason: HOSPADM

## 2017-01-01 RX ORDER — HYDROCODONE BITARTRATE AND ACETAMINOPHEN 5; 325 MG/1; MG/1
1 TABLET ORAL
Status: DISCONTINUED | OUTPATIENT
Start: 2017-01-01 | End: 2017-01-01 | Stop reason: HOSPADM

## 2017-01-01 RX ORDER — CEFAZOLIN SODIUM IN 0.9 % NACL 2 G/50 ML
2 INTRAVENOUS SOLUTION, PIGGYBACK (ML) INTRAVENOUS
Status: COMPLETED | OUTPATIENT
Start: 2017-01-01 | End: 2017-01-01

## 2017-01-01 RX ORDER — ATORVASTATIN CALCIUM 20 MG/1
10 TABLET, FILM COATED ORAL DAILY
Status: DISCONTINUED | OUTPATIENT
Start: 2017-01-01 | End: 2017-01-01

## 2017-01-01 RX ORDER — DIPHENHYDRAMINE HCL 25 MG
25 CAPSULE ORAL
Status: DISCONTINUED | OUTPATIENT
Start: 2017-01-01 | End: 2017-01-01 | Stop reason: HOSPADM

## 2017-01-01 RX ORDER — ONDANSETRON 2 MG/ML
INJECTION INTRAMUSCULAR; INTRAVENOUS AS NEEDED
Status: DISCONTINUED | OUTPATIENT
Start: 2017-01-01 | End: 2017-01-01 | Stop reason: HOSPADM

## 2017-01-01 RX ORDER — MUPIROCIN 20 MG/G
OINTMENT TOPICAL 2 TIMES DAILY
Status: DISCONTINUED | OUTPATIENT
Start: 2017-01-01 | End: 2017-01-01 | Stop reason: HOSPADM

## 2017-01-01 RX ORDER — SODIUM CHLORIDE 9 MG/ML
75 INJECTION, SOLUTION INTRAVENOUS CONTINUOUS
Status: DISCONTINUED | OUTPATIENT
Start: 2017-01-01 | End: 2017-01-01 | Stop reason: HOSPADM

## 2017-01-01 RX ORDER — FUROSEMIDE 40 MG/1
40 TABLET ORAL
Qty: 30 TAB | Refills: 2 | Status: SHIPPED | OUTPATIENT
Start: 2017-01-01

## 2017-01-01 RX ORDER — MIDAZOLAM HYDROCHLORIDE 1 MG/ML
2 INJECTION, SOLUTION INTRAMUSCULAR; INTRAVENOUS
Status: CANCELLED | OUTPATIENT
Start: 2017-01-01

## 2017-01-01 RX ORDER — LIDOCAINE HYDROCHLORIDE 10 MG/ML
0.1 INJECTION INFILTRATION; PERINEURAL AS NEEDED
Status: CANCELLED | OUTPATIENT
Start: 2017-01-01

## 2017-01-01 RX ORDER — FUROSEMIDE 10 MG/ML
INJECTION INTRAMUSCULAR; INTRAVENOUS
Status: COMPLETED
Start: 2017-01-01 | End: 2017-01-01

## 2017-01-01 RX ORDER — CEFAZOLIN SODIUM IN 0.9 % NACL 2 G/50 ML
2 INTRAVENOUS SOLUTION, PIGGYBACK (ML) INTRAVENOUS EVERY 8 HOURS
Status: COMPLETED | OUTPATIENT
Start: 2017-01-01 | End: 2017-01-01

## 2017-01-01 RX ORDER — MIDAZOLAM HYDROCHLORIDE 1 MG/ML
2 INJECTION, SOLUTION INTRAMUSCULAR; INTRAVENOUS ONCE
Status: CANCELLED | OUTPATIENT
Start: 2017-01-01 | End: 2017-01-01

## 2017-01-01 RX ORDER — FUROSEMIDE 10 MG/ML
40 INJECTION INTRAMUSCULAR; INTRAVENOUS ONCE
Status: COMPLETED | OUTPATIENT
Start: 2017-01-01 | End: 2017-01-01

## 2017-01-01 RX ORDER — AMIODARONE HYDROCHLORIDE 200 MG/1
200 TABLET ORAL 2 TIMES DAILY
Qty: 60 TAB | Refills: 11 | Status: SHIPPED | OUTPATIENT
Start: 2017-01-01 | End: 2017-01-01 | Stop reason: SDUPTHER

## 2017-01-01 RX ORDER — SODIUM CHLORIDE 9 MG/ML
INJECTION, SOLUTION INTRAVENOUS
Status: DISCONTINUED | OUTPATIENT
Start: 2017-01-01 | End: 2017-01-01 | Stop reason: HOSPADM

## 2017-01-01 RX ORDER — SODIUM CHLORIDE, SODIUM LACTATE, POTASSIUM CHLORIDE, CALCIUM CHLORIDE 600; 310; 30; 20 MG/100ML; MG/100ML; MG/100ML; MG/100ML
INJECTION, SOLUTION INTRAVENOUS
Status: DISCONTINUED | OUTPATIENT
Start: 2017-01-01 | End: 2017-01-01 | Stop reason: HOSPADM

## 2017-01-01 RX ORDER — SODIUM CHLORIDE 0.9 % (FLUSH) 0.9 %
5-10 SYRINGE (ML) INJECTION AS NEEDED
Status: DISCONTINUED | OUTPATIENT
Start: 2017-01-01 | End: 2017-01-01 | Stop reason: HOSPADM

## 2017-01-01 RX ORDER — ONDANSETRON 2 MG/ML
4 INJECTION INTRAMUSCULAR; INTRAVENOUS
Status: DISCONTINUED | OUTPATIENT
Start: 2017-01-01 | End: 2017-01-01 | Stop reason: HOSPADM

## 2017-01-01 RX ORDER — HEPARIN SODIUM 200 [USP'U]/100ML
3 INJECTION, SOLUTION INTRAVENOUS CONTINUOUS
Status: DISCONTINUED | OUTPATIENT
Start: 2017-01-01 | End: 2017-01-01

## 2017-01-01 RX ORDER — HYDRALAZINE HYDROCHLORIDE 20 MG/ML
10 INJECTION INTRAMUSCULAR; INTRAVENOUS ONCE
Status: COMPLETED | OUTPATIENT
Start: 2017-01-01 | End: 2017-01-01

## 2017-01-01 RX ORDER — ASPIRIN 81 MG/1
81 TABLET ORAL DAILY
Status: DISCONTINUED | OUTPATIENT
Start: 2017-01-01 | End: 2017-01-01 | Stop reason: HOSPADM

## 2017-01-01 RX ORDER — IODIXANOL 320 MG/ML
INJECTION, SOLUTION INTRAVASCULAR AS NEEDED
Status: DISCONTINUED | OUTPATIENT
Start: 2017-01-01 | End: 2017-01-01 | Stop reason: HOSPADM

## 2017-01-01 RX ORDER — SODIUM CHLORIDE, SODIUM LACTATE, POTASSIUM CHLORIDE, CALCIUM CHLORIDE 600; 310; 30; 20 MG/100ML; MG/100ML; MG/100ML; MG/100ML
75 INJECTION, SOLUTION INTRAVENOUS CONTINUOUS
Status: CANCELLED | OUTPATIENT
Start: 2017-01-01 | End: 2017-01-01

## 2017-01-01 RX ORDER — MORPHINE SULFATE 2 MG/ML
2 INJECTION, SOLUTION INTRAMUSCULAR; INTRAVENOUS
Status: DISCONTINUED | OUTPATIENT
Start: 2017-01-01 | End: 2017-01-01 | Stop reason: HOSPADM

## 2017-01-01 RX ORDER — LIDOCAINE HYDROCHLORIDE 20 MG/ML
INJECTION, SOLUTION EPIDURAL; INFILTRATION; INTRACAUDAL; PERINEURAL AS NEEDED
Status: DISCONTINUED | OUTPATIENT
Start: 2017-01-01 | End: 2017-01-01 | Stop reason: HOSPADM

## 2017-01-01 RX ORDER — METOPROLOL TARTRATE 25 MG/1
25 TABLET, FILM COATED ORAL EVERY 12 HOURS
Status: DISCONTINUED | OUTPATIENT
Start: 2017-01-01 | End: 2017-01-01 | Stop reason: HOSPADM

## 2017-01-01 RX ORDER — AMIODARONE HYDROCHLORIDE 200 MG/1
200 TABLET ORAL
Status: DISCONTINUED | OUTPATIENT
Start: 2017-01-01 | End: 2017-01-01 | Stop reason: HOSPADM

## 2017-01-01 RX ORDER — SODIUM CHLORIDE 9 MG/ML
75 INJECTION, SOLUTION INTRAVENOUS CONTINUOUS
Status: DISCONTINUED | OUTPATIENT
Start: 2017-01-01 | End: 2017-01-01

## 2017-01-01 RX ORDER — VECURONIUM BROMIDE FOR INJECTION 1 MG/ML
INJECTION, POWDER, LYOPHILIZED, FOR SOLUTION INTRAVENOUS AS NEEDED
Status: DISCONTINUED | OUTPATIENT
Start: 2017-01-01 | End: 2017-01-01 | Stop reason: HOSPADM

## 2017-01-01 RX ORDER — ATORVASTATIN CALCIUM 10 MG/1
10 TABLET, FILM COATED ORAL
Status: DISCONTINUED | OUTPATIENT
Start: 2017-01-01 | End: 2017-01-01 | Stop reason: HOSPADM

## 2017-01-01 RX ORDER — ATORVASTATIN CALCIUM 10 MG/1
10 TABLET, FILM COATED ORAL DAILY
Status: DISCONTINUED | OUTPATIENT
Start: 2017-01-01 | End: 2017-01-01 | Stop reason: SDUPTHER

## 2017-01-01 RX ORDER — HYDRALAZINE HYDROCHLORIDE 20 MG/ML
10 INJECTION INTRAMUSCULAR; INTRAVENOUS
Status: DISCONTINUED | OUTPATIENT
Start: 2017-01-01 | End: 2017-01-01 | Stop reason: HOSPADM

## 2017-01-01 RX ORDER — FENTANYL CITRATE 50 UG/ML
INJECTION, SOLUTION INTRAMUSCULAR; INTRAVENOUS AS NEEDED
Status: DISCONTINUED | OUTPATIENT
Start: 2017-01-01 | End: 2017-01-01 | Stop reason: HOSPADM

## 2017-01-01 RX ORDER — ACETAMINOPHEN 325 MG/1
650 TABLET ORAL
Status: DISCONTINUED | OUTPATIENT
Start: 2017-01-01 | End: 2017-01-01 | Stop reason: HOSPADM

## 2017-01-01 RX ORDER — NEOSTIGMINE METHYLSULFATE 1 MG/ML
INJECTION INTRAVENOUS AS NEEDED
Status: DISCONTINUED | OUTPATIENT
Start: 2017-01-01 | End: 2017-01-01 | Stop reason: HOSPADM

## 2017-01-01 RX ORDER — MIDAZOLAM HYDROCHLORIDE 1 MG/ML
.5-5 INJECTION, SOLUTION INTRAMUSCULAR; INTRAVENOUS
Status: DISCONTINUED | OUTPATIENT
Start: 2017-01-01 | End: 2017-01-01

## 2017-01-01 RX ORDER — ETOMIDATE 2 MG/ML
INJECTION INTRAVENOUS AS NEEDED
Status: DISCONTINUED | OUTPATIENT
Start: 2017-01-01 | End: 2017-01-01 | Stop reason: HOSPADM

## 2017-01-01 RX ORDER — METOPROLOL TARTRATE 25 MG/1
25 TABLET, FILM COATED ORAL 2 TIMES DAILY
Status: DISCONTINUED | OUTPATIENT
Start: 2017-01-01 | End: 2017-01-01

## 2017-01-01 RX ORDER — GLYCOPYRROLATE 0.2 MG/ML
INJECTION INTRAMUSCULAR; INTRAVENOUS AS NEEDED
Status: DISCONTINUED | OUTPATIENT
Start: 2017-01-01 | End: 2017-01-01 | Stop reason: HOSPADM

## 2017-01-01 RX ORDER — TAMSULOSIN HYDROCHLORIDE 0.4 MG/1
0.4 CAPSULE ORAL
Status: DISCONTINUED | OUTPATIENT
Start: 2017-01-01 | End: 2017-01-01 | Stop reason: HOSPADM

## 2017-01-01 RX ORDER — METOPROLOL TARTRATE 25 MG/1
25 TABLET, FILM COATED ORAL 2 TIMES DAILY
Status: DISCONTINUED | OUTPATIENT
Start: 2017-01-01 | End: 2017-01-01 | Stop reason: HOSPADM

## 2017-01-01 RX ORDER — FENTANYL CITRATE 50 UG/ML
25-100 INJECTION, SOLUTION INTRAMUSCULAR; INTRAVENOUS
Status: DISCONTINUED | OUTPATIENT
Start: 2017-01-01 | End: 2017-01-01

## 2017-01-01 RX ORDER — TAMSULOSIN HYDROCHLORIDE 0.4 MG/1
0.4 CAPSULE ORAL DAILY
Status: DISCONTINUED | OUTPATIENT
Start: 2017-01-01 | End: 2017-01-01 | Stop reason: HOSPADM

## 2017-01-01 RX ORDER — PROTAMINE SULFATE 10 MG/ML
INJECTION, SOLUTION INTRAVENOUS AS NEEDED
Status: DISCONTINUED | OUTPATIENT
Start: 2017-01-01 | End: 2017-01-01 | Stop reason: HOSPADM

## 2017-01-01 RX ADMIN — FENTANYL CITRATE 87.5 MCG: 50 INJECTION, SOLUTION INTRAMUSCULAR; INTRAVENOUS at 11:05

## 2017-01-01 RX ADMIN — SODIUM CHLORIDE 75 ML/HR: 900 INJECTION, SOLUTION INTRAVENOUS at 00:20

## 2017-01-01 RX ADMIN — MUPIROCIN: 20 OINTMENT TOPICAL at 08:44

## 2017-01-01 RX ADMIN — SODIUM CHLORIDE: 9 INJECTION, SOLUTION INTRAVENOUS at 10:57

## 2017-01-01 RX ADMIN — AMIODARONE HYDROCHLORIDE 200 MG: 200 TABLET ORAL at 21:42

## 2017-01-01 RX ADMIN — ATORVASTATIN CALCIUM 10 MG: 20 TABLET, FILM COATED ORAL at 21:39

## 2017-01-01 RX ADMIN — TAMSULOSIN HYDROCHLORIDE 0.4 MG: 0.4 CAPSULE ORAL at 21:48

## 2017-01-01 RX ADMIN — METOPROLOL TARTRATE 25 MG: 25 TABLET, FILM COATED ORAL at 08:41

## 2017-01-01 RX ADMIN — HYDROCODONE BITARTRATE AND ACETAMINOPHEN 1 TABLET: 5; 325 TABLET ORAL at 16:49

## 2017-01-01 RX ADMIN — MUPIROCIN: 20 OINTMENT TOPICAL at 09:05

## 2017-01-01 RX ADMIN — ASPIRIN 81 MG: 81 TABLET, COATED ORAL at 09:04

## 2017-01-01 RX ADMIN — MUPIROCIN: 20 OINTMENT TOPICAL at 21:48

## 2017-01-01 RX ADMIN — MIDAZOLAM HYDROCHLORIDE 2 MG: 1 INJECTION, SOLUTION INTRAMUSCULAR; INTRAVENOUS at 09:37

## 2017-01-01 RX ADMIN — Medication 10 ML: at 06:04

## 2017-01-01 RX ADMIN — CEFAZOLIN 2 G: 1 INJECTION, POWDER, FOR SOLUTION INTRAMUSCULAR; INTRAVENOUS; PARENTERAL at 06:05

## 2017-01-01 RX ADMIN — MEPERIDINE HYDROCHLORIDE 12.5 MG: 25 INJECTION INTRAMUSCULAR; INTRAVENOUS; SUBCUTANEOUS at 14:49

## 2017-01-01 RX ADMIN — Medication 10 ML: at 21:51

## 2017-01-01 RX ADMIN — HEPARIN SODIUM 4000 UNITS: 1000 INJECTION, SOLUTION INTRAVENOUS; SUBCUTANEOUS at 13:00

## 2017-01-01 RX ADMIN — AMIODARONE HYDROCHLORIDE 200 MG: 200 TABLET ORAL at 08:36

## 2017-01-01 RX ADMIN — HEPARIN SODIUM 3 ML/HR: 200 INJECTION, SOLUTION INTRAVENOUS at 09:30

## 2017-01-01 RX ADMIN — METOPROLOL TARTRATE 25 MG: 25 TABLET, FILM COATED ORAL at 21:37

## 2017-01-01 RX ADMIN — GLYCOPYRROLATE 0.8 MG: 0.2 INJECTION INTRAMUSCULAR; INTRAVENOUS at 13:23

## 2017-01-01 RX ADMIN — HEPARIN SODIUM 8000 UNITS: 1000 INJECTION, SOLUTION INTRAVENOUS; SUBCUTANEOUS at 12:51

## 2017-01-01 RX ADMIN — FUROSEMIDE 40 MG: 10 INJECTION, SOLUTION INTRAMUSCULAR; INTRAVENOUS at 13:51

## 2017-01-01 RX ADMIN — ACETAMINOPHEN 650 MG: 325 TABLET ORAL at 21:10

## 2017-01-01 RX ADMIN — RIVAROXABAN 15 MG: 15 TABLET, FILM COATED ORAL at 11:37

## 2017-01-01 RX ADMIN — HYDRALAZINE HYDROCHLORIDE 10 MG: 20 INJECTION INTRAMUSCULAR; INTRAVENOUS at 10:08

## 2017-01-01 RX ADMIN — LIDOCAINE HYDROCHLORIDE 40 MG: 20 INJECTION, SOLUTION EPIDURAL; INFILTRATION; INTRACAUDAL; PERINEURAL at 11:05

## 2017-01-01 RX ADMIN — FENTANYL CITRATE 25 MCG: 50 INJECTION, SOLUTION INTRAMUSCULAR; INTRAVENOUS at 09:37

## 2017-01-01 RX ADMIN — ATORVASTATIN CALCIUM 10 MG: 10 TABLET, FILM COATED ORAL at 20:34

## 2017-01-01 RX ADMIN — ASPIRIN 81 MG: 81 TABLET, COATED ORAL at 08:44

## 2017-01-01 RX ADMIN — FENTANYL CITRATE 12.5 MCG: 50 INJECTION, SOLUTION INTRAMUSCULAR; INTRAVENOUS at 10:28

## 2017-01-01 RX ADMIN — METOPROLOL TARTRATE 25 MG: 25 TABLET ORAL at 08:44

## 2017-01-01 RX ADMIN — SODIUM CHLORIDE, SODIUM LACTATE, POTASSIUM CHLORIDE, CALCIUM CHLORIDE: 600; 310; 30; 20 INJECTION, SOLUTION INTRAVENOUS at 10:57

## 2017-01-01 RX ADMIN — AMIODARONE HYDROCHLORIDE 200 MG: 200 TABLET ORAL at 10:10

## 2017-01-01 RX ADMIN — TAMSULOSIN HYDROCHLORIDE 0.4 MG: 0.4 CAPSULE ORAL at 20:34

## 2017-01-01 RX ADMIN — PHENYLEPHRINE HYDROCHLORIDE 20 MCG/MIN: 10 INJECTION INTRAVENOUS at 13:55

## 2017-01-01 RX ADMIN — FUROSEMIDE 40 MG: 10 INJECTION INTRAMUSCULAR; INTRAVENOUS at 13:51

## 2017-01-01 RX ADMIN — NEOSTIGMINE METHYLSULFATE 5 MG: 1 INJECTION INTRAVENOUS at 13:23

## 2017-01-01 RX ADMIN — HEPARIN SODIUM 2000 UNITS: 1000 INJECTION, SOLUTION INTRAVENOUS; SUBCUTANEOUS at 13:09

## 2017-01-01 RX ADMIN — ASPIRIN 81 MG: 81 TABLET, COATED ORAL at 08:41

## 2017-01-01 RX ADMIN — SODIUM CHLORIDE 75 ML/HR: 900 INJECTION, SOLUTION INTRAVENOUS at 08:46

## 2017-01-01 RX ADMIN — ATORVASTATIN CALCIUM 10 MG: 10 TABLET, FILM COATED ORAL at 21:48

## 2017-01-01 RX ADMIN — HYDRALAZINE HYDROCHLORIDE 10 MG: 20 INJECTION INTRAMUSCULAR; INTRAVENOUS at 21:51

## 2017-01-01 RX ADMIN — METOPROLOL TARTRATE 25 MG: 25 TABLET ORAL at 20:00

## 2017-01-01 RX ADMIN — ASPIRIN 81 MG: 81 TABLET, COATED ORAL at 10:10

## 2017-01-01 RX ADMIN — METOPROLOL TARTRATE 25 MG: 25 TABLET, FILM COATED ORAL at 21:00

## 2017-01-01 RX ADMIN — VECURONIUM BROMIDE FOR INJECTION 3 MG: 1 INJECTION, POWDER, LYOPHILIZED, FOR SOLUTION INTRAVENOUS at 11:30

## 2017-01-01 RX ADMIN — Medication 10 ML: at 21:36

## 2017-01-01 RX ADMIN — Medication 10 ML: at 08:46

## 2017-01-01 RX ADMIN — LIDOCAINE HYDROCHLORIDE 60 MG: 20 INJECTION, SOLUTION EPIDURAL; INFILTRATION; INTRACAUDAL; PERINEURAL at 12:55

## 2017-01-01 RX ADMIN — IOPAMIDOL 60 ML: 755 INJECTION, SOLUTION INTRAVENOUS at 10:11

## 2017-01-01 RX ADMIN — MUPIROCIN: 20 OINTMENT TOPICAL at 20:33

## 2017-01-01 RX ADMIN — Medication 10 ML: at 05:36

## 2017-01-01 RX ADMIN — Medication 10 ML: at 18:26

## 2017-01-01 RX ADMIN — ATORVASTATIN CALCIUM 10 MG: 10 TABLET, FILM COATED ORAL at 21:50

## 2017-01-01 RX ADMIN — AMIODARONE HYDROCHLORIDE 200 MG: 200 TABLET ORAL at 09:05

## 2017-01-01 RX ADMIN — SODIUM CHLORIDE 75 ML/HR: 900 INJECTION, SOLUTION INTRAVENOUS at 14:59

## 2017-01-01 RX ADMIN — PERFLUTREN 1 ML: 6.52 INJECTION, SUSPENSION INTRAVENOUS at 08:00

## 2017-01-01 RX ADMIN — Medication 10 ML: at 15:00

## 2017-01-01 RX ADMIN — HYDRALAZINE HYDROCHLORIDE 10 MG: 20 INJECTION INTRAMUSCULAR; INTRAVENOUS at 09:44

## 2017-01-01 RX ADMIN — ETOMIDATE 20 MG: 2 INJECTION INTRAVENOUS at 11:05

## 2017-01-01 RX ADMIN — MUPIROCIN: 20 OINTMENT TOPICAL at 21:51

## 2017-01-01 RX ADMIN — CEFAZOLIN 2 G: 1 INJECTION, POWDER, FOR SOLUTION INTRAMUSCULAR; INTRAVENOUS; PARENTERAL at 21:48

## 2017-01-01 RX ADMIN — CEFAZOLIN 2 G: 1 INJECTION, POWDER, FOR SOLUTION INTRAMUSCULAR; INTRAVENOUS; PARENTERAL at 14:37

## 2017-01-01 RX ADMIN — ONDANSETRON 4 MG: 2 INJECTION INTRAMUSCULAR; INTRAVENOUS at 13:24

## 2017-01-01 RX ADMIN — METOPROLOL TARTRATE 25 MG: 25 TABLET, FILM COATED ORAL at 08:44

## 2017-01-01 RX ADMIN — VECURONIUM BROMIDE FOR INJECTION 1 MG: 1 INJECTION, POWDER, LYOPHILIZED, FOR SOLUTION INTRAVENOUS at 12:35

## 2017-01-01 RX ADMIN — VECURONIUM BROMIDE FOR INJECTION 6 MG: 1 INJECTION, POWDER, LYOPHILIZED, FOR SOLUTION INTRAVENOUS at 11:06

## 2017-01-01 RX ADMIN — SODIUM CHLORIDE 75 ML/HR: 900 INJECTION, SOLUTION INTRAVENOUS at 21:43

## 2017-01-01 RX ADMIN — PROTAMINE SULFATE 70 MG: 10 INJECTION, SOLUTION INTRAVENOUS at 13:15

## 2017-01-01 RX ADMIN — METOPROLOL TARTRATE 25 MG: 25 TABLET ORAL at 21:54

## 2017-01-01 RX ADMIN — LIDOCAINE HYDROCHLORIDE 140 MG: 20 INJECTION, SOLUTION INFILTRATION; PERINEURAL at 09:40

## 2017-01-01 RX ADMIN — MUPIROCIN: 20 OINTMENT TOPICAL at 10:10

## 2017-01-01 RX ADMIN — Medication: at 16:05

## 2017-01-01 RX ADMIN — SODIUM CHLORIDE: 9 INJECTION, SOLUTION INTRAVENOUS at 13:27

## 2017-01-01 RX ADMIN — CEFAZOLIN 2 G: 1 INJECTION, POWDER, FOR SOLUTION INTRAMUSCULAR; INTRAVENOUS; PARENTERAL at 11:35

## 2017-01-01 RX ADMIN — SODIUM CHLORIDE 75 ML/HR: 900 INJECTION, SOLUTION INTRAVENOUS at 14:00

## 2017-01-01 RX ADMIN — METOPROLOL TARTRATE 25 MG: 25 TABLET ORAL at 09:05

## 2017-01-01 RX ADMIN — TAMSULOSIN HYDROCHLORIDE 0.4 MG: 0.4 CAPSULE ORAL at 21:40

## 2017-01-17 ENCOUNTER — APPOINTMENT (RX ONLY)
Dept: URBAN - METROPOLITAN AREA CLINIC 24 | Facility: CLINIC | Age: 82
Setting detail: DERMATOLOGY
End: 2017-01-17

## 2017-01-17 DIAGNOSIS — L57.0 ACTINIC KERATOSIS: ICD-10-CM

## 2017-01-17 PROBLEM — D04.62 CARCINOMA IN SITU OF SKIN OF LEFT UPPER LIMB, INCLUDING SHOULDER: Status: ACTIVE | Noted: 2017-01-17

## 2017-01-17 PROCEDURE — ? LIQUID NITROGEN

## 2017-01-17 PROCEDURE — 17261 DSTRJ MAL LES T/A/L .6-1.0CM: CPT | Mod: 59

## 2017-01-17 PROCEDURE — 17000 DESTRUCT PREMALG LESION: CPT

## 2017-01-17 PROCEDURE — ? CURETTAGE AND DESTRUCTION

## 2017-01-17 ASSESSMENT — LOCATION ZONE DERM: LOCATION ZONE: EAR

## 2017-01-17 ASSESSMENT — LOCATION SIMPLE DESCRIPTION DERM: LOCATION SIMPLE: RIGHT EAR

## 2017-01-17 ASSESSMENT — LOCATION DETAILED DESCRIPTION DERM: LOCATION DETAILED: RIGHT INFERIOR POSTERIOR HELIX

## 2017-01-17 NOTE — PROCEDURE: CURETTAGE AND DESTRUCTION
Size Of Lesion In Cm: 0.8
Bill As A Line Item Or As Units: Line Item
Number Of Curettages: 2
Bill For Surgical Tray: no
Cautery Type: electrodesiccation
Post-Care Instructions: I reviewed with the patient in detail post-care instructions. Patient is to keep the area dry for 48 hours, and not to engage in any swimming until the area is healed. Should the patient develop any fevers, chills, bleeding, severe pain patient will contact the office immediately.
Anesthesia Type: 1% lidocaine with 1:100,000 epinephrine and a 1:10 solution of 8.4% sodium bicarbonate
What Was Performed First?: Curettage
Anesthesia Volume In Cc: 1.5
Consent was obtained from the patient. The risks, benefits and alternatives to therapy were discussed in detail. Specifically, the risks of infection, scarring, bleeding, prolonged wound healing, nerve injury, incomplete removal, allergy to anesthesia and recurrence were addressed. Alternatives to ED&C, such as: surgical removal and XRT were also discussed.  Prior to the procedure, the treatment site was clearly identified and confirmed by the patient. All components of Universal Protocol/PAUSE Rule completed.
Size Of Lesion After Curettage: 1
Additional Information: (Optional): The wound was cleaned, and a pressure dressing was applied.  The patient received detailed post-op instructions.
Detail Level: Detailed

## 2017-01-17 NOTE — PROCEDURE: LIQUID NITROGEN
Detail Level: Simple
Number Of Freeze-Thaw Cycles: 1 freeze-thaw cycle
Render Post-Care Instructions In Note?: no
Consent: The patient's consent was obtained including but not limited to risks of crusting, scabbing, blistering, scarring, darker or lighter pigmentary change, recurrence, incomplete removal and infection.
Post-Care Instructions: I reviewed with the patient in detail post-care instructions. Patient is to wear sunprotection, and avoid picking at any of the treated lesions. Pt may apply Vaseline to crusted or scabbing areas.
Duration Of Freeze Thaw-Cycle (Seconds): 3

## 2017-01-18 PROBLEM — I48.91 ATRIAL FIBRILLATION (HCC): Status: ACTIVE | Noted: 2017-01-01

## 2017-02-07 NOTE — PROGRESS NOTES
2/7/2017 10:03 AM    Admit Date: 2/7/2017    Admit Diagnosis: Aortic stenosis [I35.0]      Subjective:   No cp or sob- presents for bernardo/dcc but in nsr      Objective:      Visit Vitals    Ht 5' 7\" (1.702 m)    Wt 83 kg (183 lb)    BMI 28.66 kg/m2       Physical Exam:  [place], Well Nourished, No Acute Distress, Alert & Oriented x 3, appropriate mood. Neck- supple, no JVD  CV- regular rate and rhythm no MRG  Lung- clear bilaterally  Abd- soft, nontender, nondistended  Ext- no edema bilaterally. Skin- warm and dry        Data Review:   No results for input(s): NA, K, BUN, CREA, WBC, HGB, HCT, PLT, INR, LDLCPOC, HDL, HGBEXT, HCTEXT, PLTEXT in the last 72 hours. No lab exists for component: TRP, TCHOL*, TRIGLYCERIDE*, LDL*, HDL*, INREXT    Assessment/Plan:     Active Problems:    Af- now in nsr with antony of atachy- add po amio and follow up in 2 weeks- no need for dcc as in nsr. Continue pradaxa  Cad- Stable. Continue current medical therapy.   htn- worse- give lopressopr this am

## 2017-02-07 NOTE — PROGRESS NOTES
Patient received to [address] [place] [address] room # 1  Ambulatory from [facility]. Patient scheduled for JONAS/DCC today with Dr Dot Horner. Procedure reviewed & questions answered, voiced good understanding consent obtained & placed on chart. All medications and medical history reviewed. Will prep patient per orders. Patient & family updated on plan of care. Patient states he took Pradaxa this morning. EKG questionable sinus tach. Dr Dot Horner aware. New orders received for Lopressor 5 mg iv.

## 2017-02-07 NOTE — IP AVS SNAPSHOT
[address] 
[phone] Patient: Sydney Champagne 
MRN: [id number] ASY:[id number] Discharge Summary 2/7/2017 Sydney Champagne  
 MRN[de-identified]  [id number] Admission Information Provider Pager Service Admission Date Expected D/C Date Gin Samano MD  CARDIAC CATH LAB 2/7/2017 2/7/2017 Actual LOS Patient Class 0 days OUTPATIENT Follow-up Information Follow up With Details Comments Contact Info Lida Garnica [address] [place] [address]  [phone] 
[phone] Gin Samano MD On 2/17/2017 @ 9:15 am [facility] [place] [facility]  [address] [place] [address] 
[phone] Current Discharge Medication List  
  
START taking these medications Dose & Instructions Dispensing Information Comments Morning Noon Evening Bedtime  
 amiodarone 200 mg tablet Commonly known as:  CORDARONE Your next dose is: Today, Tomorrow Other:  _________ Dose:  200 mg Take 1 Tab by mouth two (2) times a day. Quantity:  60 Tab Refills:  11 CONTINUE these medications which have NOT CHANGED Dose & Instructions Dispensing Information Comments Morning Noon Evening Bedtime  
 aspirin delayed-release 81 mg tablet Your next dose is: Today, Tomorrow Other:  _________ Take  by mouth daily. Refills:  0  
     
   
   
   
  
 atorvastatin 20 mg tablet Commonly known as:  LIPITOR Your next dose is: Today, Tomorrow Other:  _________ Dose:  10 mg Take 10 mg by mouth daily. Refills:  0  
     
   
   
   
  
 dabigatran etexilate 75 mg capsule Commonly known as:  PRADAXA Your next dose is: Today, Tomorrow Other:  _________ Dose:  75 mg Take 1 Cap by mouth every twelve (12) hours. Quantity:  60 Cap Refills:  11  
     
   
   
   
  
 diphenhydrAMINE 25 mg capsule Commonly known as:  BENADRYL Your next dose is: Today, Tomorrow Other:  _________ Dose:  25 mg Take 25 mg by mouth every six (6) hours as needed. Refills:  0  
     
   
   
   
  
 metoprolol tartrate 25 mg tablet Commonly known as:  LOPRESSOR Your next dose is: Today, Tomorrow Other:  _________ Dose:  25 mg Take 1 Tab by mouth two (2) times a day. Quantity:  60 Tab Refills:  11  
     
   
   
   
  
 tamsulosin 0.4 mg capsule Commonly known as:  FLOMAX Your next dose is: Today, Tomorrow Other:  _________ Dose:  0.4 mg Take 0.4 mg by mouth daily. Refills:  0 Where to Get Your Medications Information on where to get these meds will be given to you by the nurse or doctor. ! Ask your nurse or doctor about these medications  
  amiodarone 200 mg tablet General Information Please provide this summary of care documentation to your next provider. Allergies No Known Allergies Current Immunizations  Never Reviewed No immunizations on file. Discharge Instructions Discharge Instructions Amiodarone (By mouth) Amiodarone Hydrochloride (i-bbc-FN-da-stella sharon-droe-KLOR-carter) Treats heart rhythm problems. Brand Name(s):Cordarone, Pacerone There may be other brand names for this medicine. When This Medicine Should Not Be Used: This medicine is not right for everyone. Do not use it if you had an allergic reaction to amiodarone or iodine, or you are pregnant or breastfeeding. How to Use This Medicine:  
Tablet · Take your medicine as directed. Your dose may need to be changed several times to find what works best for you. · Take this medicine the same way every day.  This means take it at the same time and take it consistently with or without food. · This medicine should come with a Medication Guide. Ask your pharmacist for a copy if you do not have one. · Missed dose: Take a dose as soon as you remember. If it is almost time for your next dose, wait until then and take a regular dose. Do not take extra medicine to make up for a missed dose. · Store the medicine in a closed container at room temperature, away from heat, moisture, and direct light. Drugs and Foods to Avoid: Ask your doctor or pharmacist before using any other medicine, including over-the-counter medicines, vitamins, and herbal products. · Some foods and medicines can affect how amiodarone works. Tell your doctor if you are using any of the following: ¨ Cimetidine, clopidogrel, cyclosporine, dabigatran, dextromethorphan, digoxin, fentanyl, lithium, loratadine, phenytoin, rifampin, Radha's wort ¨ Blood pressure medicines (including diltiazem, verapamil) ¨ Blood thinner (including warfarin) ¨ Medicine to lower cholesterol (including atorvastatin, cholestyramine, lovastatin, simvastatin) ¨ Medicine to treat depression (including trazodone) ¨ Medicine to treat HIV/AIDS ¨ Medicine to treat an infection ¨ Phenothiazine medicine (including chlorpromazine, perphenazine, promethazine, thioridazine) · Do not eat grapefruit or drink grapefruit juice while you are using this medicine. Warnings While Using This Medicine: · It is not safe to take this medicine during pregnancy. It could harm an unborn baby. Tell your doctor right away if you become pregnant. · Tell your doctor if you have liver problems, heart disease, heart failure, thyroid problems, or lung disease or breathing problems. Tell your doctor if you have a pacemaker or another implanted heart device. · This medicine may cause the following problems: 
¨ Lung problems ¨ Worsening heart rhythm problems ¨ Thyroid problems ¨ Liver problems ¨ Changes in vision · Do not stop using this medicine suddenly. Your doctor will need to slowly decrease your dose before you stop it completely. · This medicine may make your skin more sensitive to sunlight. Wear sunscreen. Do not use sunlamps or tanning beds. Your skin may become discolored if you take this medicine for a long time. · Your doctor will do lab tests at regular visits to check on the effects of this medicine. Keep all appointments. · Keep all medicine out of the reach of children. Never share your medicine with anyone. Possible Side Effects While Using This Medicine:  
Call your doctor right away if you notice any of these side effects: · Allergic reaction: Itching or hives, swelling in your face or hands, swelling or tingling in your mouth or throat, chest tightness, trouble breathing · Blistering, peeling, red skin rash · Blurred vision or other vision changes, eye pain · Chest pain, cough, trouble breathing · Dark urine or pale stools, nausea, vomiting, loss of appetite, stomach pain, yellow skin or eyes · Fast, slow, pounding, or uneven heartbeat (new or worsening) · Lightheadedness, dizziness, fainting · Numbness, tingling, or burning pain in your hands, arms, legs, or feet · Weight gain or loss, nervousness, tremors, trouble sleeping, unusual tiredness, hair loss If you notice these less serious side effects, talk with your doctor: · Mild nausea, vomiting, or constipation If you notice other side effects that you think are caused by this medicine, tell your doctor. Call your doctor for medical advice about side effects. You may report side effects to FDA at 7-926-FDA-8525 © 2016 [address] is for End User's use only and may not be sold, redistributed or otherwise used for commercial purposes. The above information is an  only. It is not intended as medical advice for individual conditions or treatments.  Talk to your doctor, nurse or pharmacist before following any medical regimen to see if it is safe and effective for you. Heart-Healthy Diet: Care Instructions Your Care Instructions A heart-healthy diet has lots of vegetables, fruits, nuts, beans, and whole grains, and is low in salt. It limits foods that are high in saturated fat, such as meats, cheeses, and fried foods. It may be hard to change your diet, but even small changes can lower your risk of heart attack and heart disease. Follow-up care is a key part of your treatment and safety. Be sure to make and go to all appointments, and call your doctor if you are having problems. It's also a good idea to know your test results and keep a list of the medicines you take. How can you care for yourself at home? Watch your portions · Learn what a serving is. A \"serving\" and a \"portion\" are not always the same thing. Make sure that you are not eating larger portions than are recommended. For example, a serving of pasta is ½ cup. A serving size of meat is 2 to 3 ounces. A 3-ounce serving is about the size of a deck of cards. Measure serving sizes until you are good at [place]" them. Keep in mind that restaurants often serve portions that are 2 or 3 times the size of one serving. · To keep your energy level up and keep you from feeling hungry, eat often but in smaller portions. · Eat only the number of calories you need to stay at a healthy weight. If you need to lose weight, eat fewer calories than your body burns (through exercise and other physical activity). Eat more fruits and vegetables · Eat a variety of fruit and vegetables every day. Dark green, deep orange, red, or yellow fruits and vegetables are especially good for you. Examples include spinach, carrots, peaches, and berries. · Keep carrots, celery, and other veggies handy for snacks. Buy fruit that is in season and store it where you can see it so that you will be tempted to eat it. · Cook dishes that have a lot of veggies in them, such as stir-fries and soups. Limit saturated and trans fat · Read food labels, and try to avoid saturated and trans fats. They increase your risk of heart disease. Trans fat is found in many processed foods such as cookies and crackers. · Use olive or canola oil when you cook. Try cholesterol-lowering spreads, such as Benecol or Take Control. · Bake, broil, grill, or steam foods instead of frying them. · Choose lean meats instead of high-fat meats such as hot dogs and sausages. Cut off all visible fat when you prepare meat. · Eat fish, skinless poultry, and meat alternatives such as soy products instead of high-fat meats. Soy products, such as tofu, may be especially good for your heart. · Choose low-fat or fat-free milk and dairy products. Eat fish · Eat at least two servings of fish a week. Certain fish, such as salmon and tuna, contain omega-3 fatty acids, which may help reduce your risk of heart attack. Eat foods high in fiber · Eat a variety of grain products every day. Include whole-grain foods that have lots of fiber and nutrients. Examples of whole-grain foods include oats, whole wheat bread, and brown rice. · Buy whole-grain breads and cereals, instead of white bread or pastries. Limit salt and sodium · Limit how much salt and sodium you eat to help lower your blood pressure. · Taste food before you salt it. Add only a little salt when you think you need it. With time, your taste buds will adjust to less salt. · Eat fewer snack items, fast foods, and other high-salt, processed foods. Check food labels for the amount of sodium in packaged foods. · Choose low-sodium versions of canned goods (such as soups, vegetables, and beans). Limit sugar · Limit drinks and foods with added sugar. These include candy, desserts, and soda pop. Limit alcohol · Limit alcohol to no more than 2 drinks a day for men and 1 drink a day for women. Too much alcohol can cause health problems. When should you call for help? Watch closely for changes in your health, and be sure to contact your doctor if: 
· You would like help planning heart-healthy meals. Where can you learn more? Go to http://leslie-get.info/. Enter V137 in the search box to learn more about \"Heart-Healthy Diet: Care Instructions. \" Current as of: January 27, 2016 Content Version: 11.1 © 2006-2016 [employer]. Care instructions adapted under license by [employer] (which disclaims liability or warranty for this information). If you have questions about a medical condition or this instruction, always ask your healthcare professional. [address] any warranty or liability for your use of this information. Discharge Orders None  
  
` Patient Signature:  ____________________________________________________________ Date:  ____________________________________________________________  
  
 Eloina Sleight Provider Signature:  ____________________________________________________________ Date:  ____________________________________________________________

## 2017-02-07 NOTE — DISCHARGE INSTRUCTIONS
Amiodarone (By mouth)   Amiodarone Hydrochloride (y-maj-MH-da-stella sharon-droe-KLOR-carter)  Treats heart rhythm problems. Brand Name(s):Cordarone, Pacerone   There may be other brand names for this medicine. When This Medicine Should Not Be Used: This medicine is not right for everyone. Do not use it if you had an allergic reaction to amiodarone or iodine, or you are pregnant or breastfeeding. How to Use This Medicine:   Tablet  · Take your medicine as directed. Your dose may need to be changed several times to find what works best for you. · Take this medicine the same way every day. This means take it at the same time and take it consistently with or without food. · This medicine should come with a Medication Guide. Ask your pharmacist for a copy if you do not have one. · Missed dose: Take a dose as soon as you remember. If it is almost time for your next dose, wait until then and take a regular dose. Do not take extra medicine to make up for a missed dose. · Store the medicine in a closed container at room temperature, away from heat, moisture, and direct light. Drugs and Foods to Avoid:   Ask your doctor or pharmacist before using any other medicine, including over-the-counter medicines, vitamins, and herbal products. · Some foods and medicines can affect how amiodarone works.  Tell your doctor if you are using any of the following:  ¨ Cimetidine, clopidogrel, cyclosporine, dabigatran, dextromethorphan, digoxin, fentanyl, lithium, loratadine, phenytoin, rifampin, Radha's wort  ¨ Blood pressure medicines (including diltiazem, verapamil)  ¨ Blood thinner (including warfarin)  ¨ Medicine to lower cholesterol (including atorvastatin, cholestyramine, lovastatin, simvastatin)  ¨ Medicine to treat depression (including trazodone)  ¨ Medicine to treat HIV/AIDS  ¨ Medicine to treat an infection  ¨ Phenothiazine medicine (including chlorpromazine, perphenazine, promethazine, thioridazine)  · Do not eat grapefruit or drink grapefruit juice while you are using this medicine. Warnings While Using This Medicine:   · It is not safe to take this medicine during pregnancy. It could harm an unborn baby. Tell your doctor right away if you become pregnant. · Tell your doctor if you have liver problems, heart disease, heart failure, thyroid problems, or lung disease or breathing problems. Tell your doctor if you have a pacemaker or another implanted heart device. · This medicine may cause the following problems:  ¨ Lung problems  ¨ Worsening heart rhythm problems  ¨ Thyroid problems  ¨ Liver problems  ¨ Changes in vision  · Do not stop using this medicine suddenly. Your doctor will need to slowly decrease your dose before you stop it completely. · This medicine may make your skin more sensitive to sunlight. Wear sunscreen. Do not use sunlamps or tanning beds. Your skin may become discolored if you take this medicine for a long time. · Your doctor will do lab tests at regular visits to check on the effects of this medicine. Keep all appointments. · Keep all medicine out of the reach of children. Never share your medicine with anyone.   Possible Side Effects While Using This Medicine:   Call your doctor right away if you notice any of these side effects:  · Allergic reaction: Itching or hives, swelling in your face or hands, swelling or tingling in your mouth or throat, chest tightness, trouble breathing  · Blistering, peeling, red skin rash  · Blurred vision or other vision changes, eye pain  · Chest pain, cough, trouble breathing  · Dark urine or pale stools, nausea, vomiting, loss of appetite, stomach pain, yellow skin or eyes  · Fast, slow, pounding, or uneven heartbeat (new or worsening)  · Lightheadedness, dizziness, fainting  · Numbness, tingling, or burning pain in your hands, arms, legs, or feet  · Weight gain or loss, nervousness, tremors, trouble sleeping, unusual tiredness, hair loss  If you notice these less serious side effects, talk with your doctor:   · Mild nausea, vomiting, or constipation  If you notice other side effects that you think are caused by this medicine, tell your doctor. Call your doctor for medical advice about side effects. You may report side effects to FDA at 0-754-FDA-9406  © 2016 [address] is for End User's use only and may not be sold, redistributed or otherwise used for commercial purposes. The above information is an  only. It is not intended as medical advice for individual conditions or treatments. Talk to your doctor, nurse or pharmacist before following any medical regimen to see if it is safe and effective for you. Heart-Healthy Diet: Care Instructions  Your Care Instructions    A heart-healthy diet has lots of vegetables, fruits, nuts, beans, and whole grains, and is low in salt. It limits foods that are high in saturated fat, such as meats, cheeses, and fried foods. It may be hard to change your diet, but even small changes can lower your risk of heart attack and heart disease. Follow-up care is a key part of your treatment and safety. Be sure to make and go to all appointments, and call your doctor if you are having problems. It's also a good idea to know your test results and keep a list of the medicines you take. How can you care for yourself at home? Watch your portions  · Learn what a serving is. A \"serving\" and a \"portion\" are not always the same thing. Make sure that you are not eating larger portions than are recommended. For example, a serving of pasta is ½ cup. A serving size of meat is 2 to 3 ounces. A 3-ounce serving is about the size of a deck of cards. Measure serving sizes until you are good at [place]" them. Keep in mind that restaurants often serve portions that are 2 or 3 times the size of one serving. · To keep your energy level up and keep you from feeling hungry, eat often but in smaller portions.   · Eat only the number of calories you need to stay at a healthy weight. If you need to lose weight, eat fewer calories than your body burns (through exercise and other physical activity). Eat more fruits and vegetables  · Eat a variety of fruit and vegetables every day. Dark green, deep orange, red, or yellow fruits and vegetables are especially good for you. Examples include spinach, carrots, peaches, and berries. · Keep carrots, celery, and other veggies handy for snacks. Buy fruit that is in season and store it where you can see it so that you will be tempted to eat it. · Cook dishes that have a lot of veggies in them, such as stir-fries and soups. Limit saturated and trans fat  · Read food labels, and try to avoid saturated and trans fats. They increase your risk of heart disease. Trans fat is found in many processed foods such as cookies and crackers. · Use olive or canola oil when you cook. Try cholesterol-lowering spreads, such as Benecol or Take Control. · Bake, broil, grill, or steam foods instead of frying them. · Choose lean meats instead of high-fat meats such as hot dogs and sausages. Cut off all visible fat when you prepare meat. · Eat fish, skinless poultry, and meat alternatives such as soy products instead of high-fat meats. Soy products, such as tofu, may be especially good for your heart. · Choose low-fat or fat-free milk and dairy products. Eat fish  · Eat at least two servings of fish a week. Certain fish, such as salmon and tuna, contain omega-3 fatty acids, which may help reduce your risk of heart attack. Eat foods high in fiber  · Eat a variety of grain products every day. Include whole-grain foods that have lots of fiber and nutrients. Examples of whole-grain foods include oats, whole wheat bread, and brown rice. · Buy whole-grain breads and cereals, instead of white bread or pastries.   Limit salt and sodium  · Limit how much salt and sodium you eat to help lower your blood pressure. · Taste food before you salt it. Add only a little salt when you think you need it. With time, your taste buds will adjust to less salt. · Eat fewer snack items, fast foods, and other high-salt, processed foods. Check food labels for the amount of sodium in packaged foods. · Choose low-sodium versions of canned goods (such as soups, vegetables, and beans). Limit sugar  · Limit drinks and foods with added sugar. These include candy, desserts, and soda pop. Limit alcohol  · Limit alcohol to no more than 2 drinks a day for men and 1 drink a day for women. Too much alcohol can cause health problems. When should you call for help? Watch closely for changes in your health, and be sure to contact your doctor if:  · You would like help planning heart-healthy meals. Where can you learn more? Go to http://leslie-get.info/. Enter V137 in the search box to learn more about \"Heart-Healthy Diet: Care Instructions. \"  Current as of: January 27, 2016  Content Version: 11.1  © 8968-0758 [employer]. Care instructions adapted under license by [employer] (which disclaims liability or warranty for this information). If you have questions about a medical condition or this instruction, always ask your healthcare professional. [address] any warranty or liability for your use of this information.

## 2017-03-23 PROBLEM — N18.4 CKD (CHRONIC KIDNEY DISEASE) STAGE 4, GFR 15-29 ML/MIN (HCC): Status: ACTIVE | Noted: 2017-01-01

## 2017-04-11 PROBLEM — R55 NEAR SYNCOPE: Status: ACTIVE | Noted: 2017-01-01

## 2017-05-02 PROBLEM — I25.10 CAD (CORONARY ARTERY DISEASE): Status: ACTIVE | Noted: 2017-01-01

## 2017-05-02 NOTE — PROGRESS NOTES
Bedside shift change report given to Valerie Boyd RN. Report included the following information SBAR, Kardex, MAR and Recent Results.

## 2017-05-02 NOTE — H&P
[facility] Cardiology History & Physical      Date of  Admission: 5/2/2017  1:26 PM     Primary Care Physician:  Dr. Harjinder Pereira  Primary Cardiologist:  Dr. Nadine Ring Physician:  Dr. Dionne Car    CC:  BARRETO, CAD    HPI:  Madhav Brito is a 80 y.o. male with PMH of rheumatic AS with severe AS on recent echo with DAHLIA of 0.82, a fib, CKD, CAD with CABG (lima-LAD, SVG-RI, SVG seq-OM/PDA), DSL, and HTN, who presents today as a direct admit for pre hydration prior to [facility] in AM as part of TAVR work up. Patient is alert and oriented, and denies chest pain, palpitations, nausea, vomiting, fever or chills. He continues to have BARRETO and near syncope on occasion. After [facility] he will have CTA in [place] prior to TAVR.        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 [address] 8/24/2015    Esophageal reflux 8/24/2015    Essential hypertension, benign 8/24/2015    GERD (gastroesophageal reflux disease) 8/5/2016    HLD (hyperlipidemia) other unsp Dyslipidemia  8/5/2016    HTN - controlled, benign 8/5/2016    Malaise/fatigue/weakness/tiredness 8/5/2016    Mitral valve stenosis and aortic valve stenosis 8/24/2015    Other and unspecified hyperlipidemia 8/24/2015    Other malaise and fatigue 8/24/2015    Rheumatic aortic stenosis 8/5/2016    S/P CABG  8/5/2016    Shortness of breath 8/24/2015    Tachycardia 8/5/2016    Tachycardia, unspecified 8/24/2015      Past Surgical History:   Procedure Laterality Date    CARDIAC SURG PROCEDURE UNLIST      CABG     HX CHOLECYSTECTOMY      HX HEENT      cataract bilat    HX HERNIA REPAIR  HX KNEE REPLACEMENT         No Known Allergies   Social History     Social History    Marital status:      Spouse name: N/A    Number of children: N/A    Years of education: N/A     Occupational History    Not on file. Social History Main Topics    Smoking status: Former Smoker     Quit date: 1/1/1965    Smokeless tobacco: Never Used    Alcohol use 1.2 oz/week     2 Glasses of wine per week      Comment: daily    Drug use: No    Sexual activity: Not on file     Other Topics Concern    Not on file     Social History Narrative     No family history on file. No current facility-administered medications for this encounter. Review of Systems    Review of Systems   Constitution: Negative. HENT: Negative. Eyes: Negative. Cardiovascular: Positive for dyspnea on exertion and near-syncope. Respiratory: Positive for shortness of breath. Endocrine: Negative. Hematologic/Lymphatic: Negative. Skin: Negative. Musculoskeletal: Negative. Gastrointestinal: Negative. Genitourinary: Negative. Neurological: Negative. Psychiatric/Behavioral: Negative. Allergic/Immunologic: Negative. Subjective: There were no vitals taken for this visit. Physical Exam   Constitutional: He is oriented to person, place, and time and well-developed, well-nourished, and in no distress. HENT:   Head: Normocephalic. Eyes: Pupils are equal, round, and reactive to light. Neck: Normal range of motion. Cardiovascular:   Murmur heard. Pulmonary/Chest: Effort normal and breath sounds normal.   Abdominal: Soft. Bowel sounds are normal.   Musculoskeletal: He exhibits edema. Neurological: He is alert and oriented to person, place, and time. Skin: Skin is warm and dry.    Psychiatric: Mood, memory, affect and judgment normal.       Cardiographics  Telemetry: normal sinus rhythm  ECG: pending      Labs: No results found for this or any previous visit (from the past 24 hour(s)). Patient has been seen and examined by Dr. Kristel Desai and he agrees with the following assessment and plan:     Assessment/Plan:          CAD (coronary artery disease) --  S/p CABG       Aortic valve disorder-- needs TAVR, plan LHC in AM with CTA in [place] after cath. Essential hypertension, benign -- continue home medications. Dyslipidemia-- continue statin      HTN - controlled, benign -- continue metoprolol, monitor BP and titrate as needed. Atrial fibrillation -- in [place] now. Continue amiodarone and metoprolol. Xarelto on hold for LHC. CKD (chronic kidney disease) stage 4, GFR 15-29 ml/min-- check labs, overnight hydration, check labs in AM.       Near syncope-- monitor.  LHC in Am    Alecia Byrd NP  5/2/2017 1:50 PM

## 2017-05-02 NOTE — IP AVS SNAPSHOT
[address] [address] 
[phone] Patient: Duyen Suarez 
MRN: [id number] [facility]:2/58/0272 You are allergic to the following No active allergies Recent Documentation Height Weight BMI Smoking Status 1.715 m 85.2 kg 28.99 kg/m2 Former Smoker Emergency Contacts Name Discharge Info Relation Home Work Mobile Merlyn Sellers DISCHARGE CAREGIVER [3] Spouse [3] [phone] About your hospitalization You were admitted on:  May 2, 2017 You last received care in the:  [facility] 3 CLINICAL OBSERVATION You were discharged on: May 4, 2017 Unit phone number:  [phone] Why you were hospitalized Your primary diagnosis was:  Cad (Coronary Artery Disease) Your diagnoses also included:  Essential Hypertension, Benign, Dyslipidemia, Ckd (Chronic Kidney Disease) Stage 4, Gfr 15-29 Ml/Min (Hcc), Near Syncope, Atrial Fibrillation (Hcc), Aortic Valve Disorder, Htn (Hypertension), Benign Providers Seen During Your Hospitalizations Provider Role Specialty Primary office phone Rubi Johnston MD Attending Provider Cardiology [phone] Your Primary Care Physician (PCP) Primary Care Physician Office Phone Office Fax Dilip Holderer [phone] Follow-up Information Follow up With Details Comments Contact Info Sarah Barry MD Schedule an appointment as soon as possible for a visit  [address] [place] [address] [phone] Stephy Winchester MD  office will call you at home with an appointment  [facility] [address] [place] [address] 
[phone] Current Discharge Medication List  
  
CONTINUE these medications which have NOT CHANGED Dose & Instructions Dispensing Information Comments Morning Noon Evening Bedtime amiodarone 200 mg tablet Commonly known as:  CORDARONE Your last dose was: Your next dose is:    
   
   
 Dose:  200 mg Take 200 mg by mouth daily. Refills:  0  
     
   
   
   
  
 aspirin delayed-release 81 mg tablet Your last dose was: Your next dose is: Take  by mouth daily. Refills:  0  
     
   
   
   
  
 atorvastatin 20 mg tablet Commonly known as:  LIPITOR Your last dose was: Your next dose is:    
   
   
 Dose:  10 mg Take 10 mg by mouth daily. Refills:  0  
     
   
   
   
  
 diphenhydrAMINE 25 mg capsule Commonly known as:  BENADRYL Your last dose was: Your next dose is:    
   
   
 Dose:  25 mg Take 25 mg by mouth every six (6) hours as needed. Refills:  0  
     
   
   
   
  
 metoprolol tartrate 25 mg tablet Commonly known as:  LOPRESSOR Your last dose was: Your next dose is:    
   
   
 Dose:  25 mg Take 1 Tab by mouth two (2) times a day. Quantity:  60 Tab Refills:  11  
     
   
   
   
  
 rivaroxaban 15 mg Tab tablet Commonly known as:  Terry Jesus Your last dose was: Your next dose is:    
   
   
 Dose:  15 mg Take 1 Tab by mouth daily (with breakfast). Quantity:  30 Tab Refills:  11  
     
   
   
   
  
 tamsulosin 0.4 mg capsule Commonly known as:  FLOMAX Your last dose was: Your next dose is:    
   
   
 Dose:  0.4 mg Take 0.4 mg by mouth daily. Refills:  0 Discharge Instructions Angiogram: What to Expect at [facility] Your Recovery An angiogram is an X-ray test that uses dye and a camera to take pictures of the blood flow in an artery or a vein. The doctor inserted a thin, flexible tube (catheter) into a blood vessel in your groin. In some cases, the catheter is placed in a blood vessel in the arm. An angiogram is done for many reasons.  For example, you may have an angiogram to find the source of bleeding, such as an ulcer. Or it may be done to look for blocked blood vessels in your lungs. After an angiogram, your groin or arm may have a bruise and feel sore for a day or two. You can do light activities around the house but nothing strenuous for several days. Your doctor may give you specific instructions on when you can do your normal activities again, such as driving and going back to work. This care sheet gives you a general idea about how long it will take for you to recover. But each person recovers at a different pace. Follow the steps below to feel better as quickly as possible. How can you care for yourself at home? Activity · Do not do strenuous exercise and do not lift, pull, or push anything heavy until your doctor says it is okay. This may be for a day or two. You can walk around the house and do light activity, such as cooking. · You may shower 24 to 48 hours after the procedure, if your doctor okays it. Pat the incision dry. Do not take a bath for 1 week, or until your doctor tells you it is okay. · If the catheter was placed in your groin, try not to walk up stairs for the first couple of days. · If the catheter was placed in your arm near your wrist, do not bend your wrist deeply for the first couple of days. Be careful using your hand to get into and out of a chair or bed. · If your doctor recommends it, get more exercise. Walking is a good choice. Bit by bit, increase the amount you walk every day. Try for at least 30 minutes on most days of the week. Diet · Drink plenty of fluids to help your body flush out the dye. If you have kidney, heart, or liver disease and have to limit fluids, talk with your doctor before you increase the amount of fluids you drink. · You can eat your normal diet. If your stomach is upset, try bland, low-fat foods like plain rice, broiled chicken, toast, and yogurt. Medicines · Be safe with medicines. Read and follow all instructions on the label. ¨ If the doctor gave you a prescription medicine for pain, take it as prescribed. ¨ If you are not taking a prescription pain medicine, ask your doctor if you can take an over-the-counter medicine. · If you take blood thinners, such as warfarin (Coumadin), clopidogrel (Plavix), or aspirin, be sure to talk to your doctor. He or she will tell you if and when to start taking those medicines again. Make sure that you understand exactly what your doctor wants you to do. · Your doctor will tell you if and when you can restart your medicines. He or she will also give you instructions about taking any new medicines. Care of the catheter site · You will have a dressing over the cut (incision). A dressing helps the incision heal and protects it. Your doctor will tell you how to take care of this. · Put ice or a cold pack on the area for 10 to 20 minutes at a time to help with soreness or swelling. Put a thin cloth between the ice and your skin. Follow-up care is a key part of your treatment and safety. Be sure to make and go to all appointments, and call your doctor if you are having problems. It's also a good idea to know your test results and keep a list of the medicines you take. When should you call for help? Call 911 anytime you think you may need emergency care. For example, call if: 
· You passed out (lost consciousness). · You have severe trouble breathing. · You have sudden chest pain and shortness of breath, or you cough up blood. Call your doctor now or seek immediate medical care if: 
· You are bleeding from the area where the catheter was put in your artery. · You have a fast-growing, painful lump at the catheter site. · You have signs of infection, such as: 
¨ Increased pain, swelling, warmth, or redness. ¨ Red streaks leading from the incision. ¨ Pus draining from the incision. ¨ A fever. Watch closely for any changes in your health, and be sure to contact your doctor if: 
· You don't get better as expected. Where can you learn more? Go to http://leslie-get.info/. Enter W850 in the search box to learn more about \"Angiogram: What to Expect at Home. \" Current as of: October 14, 2016 Content Version: 11.2 © 5442-9777 [employer]. Care instructions adapted under license by [employer] (which disclaims liability or warranty for this information). If you have questions about a medical condition or this instruction, always ask your healthcare professional. [place] [address] any warranty or liability for your use of this information. DISCHARGE SUMMARY from Nurse The following personal items are in your possession at time of discharge: 
 
Dental Appliances: Partials, With patient Visual Aid: None Home Medications: Sent to pharmacy Jewelry: None Clothing: At bedside, Footwear, Pants, Shirt Other Valuables: None PATIENT INSTRUCTIONS: 
 
 
F-face looks uneven A-arms unable to move or move unevenly S-speech slurred or non-existent T-time-call 911 as soon as signs and symptoms begin-DO NOT go Back to bed or wait to see if you get better-TIME IS BRAIN. Warning Signs of HEART ATTACK Call 911 if you have these symptoms: 
? Chest discomfort. Most heart attacks involve discomfort in the center of the chest that lasts more than a few minutes, or that goes away and comes back. It can feel like uncomfortable pressure, squeezing, fullness, or pain. ? Discomfort in other areas of the upper body. Symptoms can include pain or discomfort in one or both arms, the back, neck, jaw, or stomach. ? Shortness of breath with or without chest discomfort. ? Other signs may include breaking out in a cold sweat, nausea, or lightheadedness. Don't wait more than five minutes to call [address]! Fast action can save your life. Calling 911 is almost always the fastest way to get lifesaving treatment. Emergency Medical Services staff can begin treatment when they arrive  up to an hour sooner than if someone gets to the hospital by car. The discharge information has been reviewed with the patient. The patient verbalized understanding. Discharge medications reviewed with the patient and appropriate educational materials and side effects teaching were provided. Discharge Orders None ACO Transitions of Care Introducing [employer] [address]ianna Diego offers a voluntary care coordination program to provide high quality service and care to [facility] fee-for-service beneficiaries. Andrea Kd was designed to help you enhance your health and well-being through the following services: ? Transitions of Care  support for individuals who are transitioning from one care setting to another (example: Hospital to home). ? Chronic and Complex Care Coordination  support for individuals and caregivers of those with serious or chronic illnesses or with more than one chronic (ongoing) condition and those who take a number of different medications. If you meet specific medical criteria, a [facility] [address] may call you directly to coordinate your care with your primary care physician and your other care providers. For questions about the [facility] programs, please, contact your physicians office. For general questions or additional information about Accountable Care Organizations: 
Please visit www.medicare.gov/acos. html or call 1-800-MEDICARE (1-978.593.4878) TTY users should call 1-571.176.7037. [employer] Announcement  We are excited to announce that we are making your provider's discharge notes available to you in [employer]. You will see these notes when they are completed and signed by the physician that discharged you from your recent hospital stay. If you have any questions or concerns about any information you see in MyChart, please call the Health Information Department where you were seen or reach out to your Primary Care Provider for more information about your plan of care. General Information Please provide this summary of care documentation to your next provider. Patient Signature:  ____________________________________________________________ Date:  ____________________________________________________________  
  
Britt Grover Provider Signature:  ____________________________________________________________ Date:  ____________________________________________________________

## 2017-05-02 NOTE — IP AVS SNAPSHOT
Current Discharge Medication List  
  
CONTINUE these medications which have NOT CHANGED Dose & Instructions Dispensing Information Comments Morning Noon Evening Bedtime  
 amiodarone 200 mg tablet Commonly known as:  CORDARONE Your last dose was: Your next dose is:    
   
   
 Dose:  200 mg Take 200 mg by mouth daily. Refills:  0  
     
   
   
   
  
 aspirin delayed-release 81 mg tablet Your last dose was: Your next dose is: Take  by mouth daily. Refills:  0  
     
   
   
   
  
 atorvastatin 20 mg tablet Commonly known as:  LIPITOR Your last dose was: Your next dose is:    
   
   
 Dose:  10 mg Take 10 mg by mouth daily. Refills:  0  
     
   
   
   
  
 diphenhydrAMINE 25 mg capsule Commonly known as:  BENADRYL Your last dose was: Your next dose is:    
   
   
 Dose:  25 mg Take 25 mg by mouth every six (6) hours as needed. Refills:  0  
     
   
   
   
  
 metoprolol tartrate 25 mg tablet Commonly known as:  LOPRESSOR Your last dose was: Your next dose is:    
   
   
 Dose:  25 mg Take 1 Tab by mouth two (2) times a day. Quantity:  60 Tab Refills:  11  
     
   
   
   
  
 rivaroxaban 15 mg Tab tablet Commonly known as:  Windy Cool Your last dose was: Your next dose is:    
   
   
 Dose:  15 mg Take 1 Tab by mouth daily (with breakfast). Quantity:  30 Tab Refills:  11  
     
   
   
   
  
 tamsulosin 0.4 mg capsule Commonly known as:  FLOMAX Your last dose was: Your next dose is:    
   
   
 Dose:  0.4 mg Take 0.4 mg by mouth daily. Refills:  0

## 2017-05-03 NOTE — PROGRESS NOTES
Problem: Falls - Risk of  Goal: *Absence of falls  Outcome: Progressing Towards Goal  Patient Alert, oriented x3,  proper use of call light, pages for assistance before getting  OOB. Nonskid socks on feet prior to getting OOB. Staff compliant with keeping bed in low, locked position; with both left and right upper side rails raised/ elevated. Bedside table and personal items within reach.

## 2017-05-03 NOTE — PROGRESS NOTES
Shift change, with bedside reporting completed. Reinforced Safety precautions: Call for assistance prior to activity, and use of nonskid socks before getting out of bed (OOB). Verbalized understanding of safety instructions. Hand held call-light within reach.

## 2017-05-03 NOTE — PROGRESS NOTES
TRANSFER - OUT REPORT:    Verbal report given to kamari rn/valeria rn(name) on July Smith  being transferred to cpru(unit) for routine progression of care       Report consisted of patients Situation, Background, Assessment and   Recommendations(SBAR). Information from the following report(s) SBAR was reviewed with the receiving nurse. Lines:   Peripheral IV 02/07/17 Right Arm (Active)       Peripheral IV 05/02/17 Left Antecubital (Active)   Site Assessment Clean, dry, & intact 5/3/2017  7:25 AM   Phlebitis Assessment 0 5/3/2017  7:25 AM   Infiltration Assessment 0 5/3/2017  7:25 AM   Dressing Status Clean, dry, & intact 5/3/2017  7:25 AM   Dressing Type Transparent;Tape 5/3/2017  7:25 AM   Hub Color/Line Status Flushed;Patent;Capped 5/3/2017  7:25 AM   Alcohol Cap Used No 5/3/2017  7:25 AM       Peripheral IV 05/03/17 Right Antecubital (Active)   Site Assessment Clean, dry, & intact 5/3/2017  7:25 AM   Phlebitis Assessment 0 5/3/2017  7:25 AM   Infiltration Assessment 0 5/3/2017  7:25 AM   Dressing Status Clean, dry, & intact 5/3/2017  7:25 AM   Dressing Type Transparent;Tape 5/3/2017  7:25 AM   Hub Color/Line Status Infusing;Pink 5/3/2017  7:25 AM   Alcohol Cap Used No 5/3/2017  7:25 AM        Opportunity for questions and clarification was provided.       Patient transported with:   Katie Jenkins   no intervention  6fr sheath rfa  No bleeding or hematoma  Versed 2mg  Fentanyl 25mcg

## 2017-05-03 NOTE — PROGRESS NOTES
Teaching and instructions regarding NPO. Nothing to eat or drink after MN. Patient verbalized understanding. No further questions, request or complaints when asked. NPO sign posted on door frame, next to room number.

## 2017-05-03 NOTE — PROGRESS NOTES
Bedside and Verbal shift change report given to self (oncoming nurse) by Kevyn Iglesias RN (offgoing nurse). Report included the following information SBAR, Kardex, MAR and Recent Results.

## 2017-05-03 NOTE — PROGRESS NOTES
TRANSFER - IN REPORT:    Verbal report received from [place], RN on [address] being received from [facility] for routine progression of care      Report consisted of patients Situation, Background, Assessment and Recommendations(SBAR). Information from the following report(s) SBAR, Kardex, Procedure Summary and MAR was reviewed with the receiving nurse. Opportunity for questions and clarification was provided. Assessment completed upon patients arrival to unit and care assumed.

## 2017-05-03 NOTE — PROGRESS NOTES
Right posterior tibial pulse was +1 and right dorsalis pedis pulse was +1 prior to sheath removal. 6FR arterial sheath removed from right groin using sterile technique at 1030. Manual pressure held over site for 20 minutes. Hemostasis achieved at 1050. Right groin without bleeding without hematoma. Sterile gauze placed over site and secured with tegaderm. 5lb sandbag placed over site. Patient instructed to keep right leg straight and still and head on pillow. Patient verbalizes understanding.   Right posterior tibial pulse was +1 and right dorsalis pedis pulse was +1 after sheath removal.

## 2017-05-03 NOTE — PROGRESS NOTES
Report received from Jim Garcia Cath Lab RN. Procedural findings communicated. Intra procedural  medication administration reviewed. Progression of care discussed. Patient received into CPRU [place] 5 post LHC with 6 fr sheath intact/patent.      Access site without bleeding or swelling yes    Dressing dry and intact yes    Patient instructed to limit movement to right lower extremity    Routine post procedural vital signs and site assessment initiated yes

## 2017-05-03 NOTE — PROGRESS NOTES
Medicare Outpatient Observation Notice provided to the patient. Oral explanation was provided and all questions answered. Signed document placed in the medical record under media tab. Copy to patient. No other needs identified at this time. Patient's wife signed for him. Patient also received code 40 letter. Patient's wife declined home health at this time.

## 2017-05-04 NOTE — PROGRESS NOTES
Spoke with pt regarding plans for CT scan prior plans for possible TAVR. I will contact pt later today or tomorrow to set up final plans for the TAVR CT scan in [place]. This will be coordinated thru there CT coordinator. Pt verbalizes understanding of the plan. Also provided TAVR educational packet and contact information for any questions.

## 2017-05-04 NOTE — DISCHARGE SUMMARY
[facility] Cardiology Discharge Summary     Patient ID:  Suyapa Mott  [id number]  80 y.o.  2/14/1928    Admit date: 5/2/2017    Discharge date:  5/4/17    Admitting Physician: Marion Clark MD     Discharge Physician: Chandan Lovelace NP/Dr. Fela Baum    Admission Diagnoses: CHEST PAIN  AORTIC STENOSIS  PRE CARDIAC CATH HYDRATION  CAD (coronary artery disease)  CAD (coronary artery disease)    Discharge Diagnoses:   Patient Active Problem List    Diagnosis Date Noted    CAD (coronary artery disease) 05/02/2017    Near syncope 04/11/2017    CKD (chronic kidney disease) stage 4, GFR 15-29 ml/min ([facility]) 03/23/2017    Atrial fibrillation ([facility] [address] 01/18/2017    Malaise/fatigue/weakness/tiredness 08/05/2016    GERD (gastroesophageal reflux disease) 08/05/2016    Tachycardia 08/05/2016    HLD (hyperlipidemia) other unsp Dyslipidemia  08/05/2016    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    Brachial artery occlusion, upper extremity ([facility] [address] 08/05/2016    S/P CABG  08/05/2016    HTN - controlled, benign 08/05/2016    Dyslipidemia 03/16/2016    Aortic valve disorder 08/24/2015    Shortness of breath 08/24/2015    Esophageal reflux 08/24/2015    Anxiety state, unspecified 08/24/2015    Mitral valve stenosis and aortic valve stenosis 08/24/2015    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 Daniel Field for TAVR workup. The patient was admitted on 5/2/17 for pre-hydration d/t CKD. The patient underwent cardiac catheterization by Dr. Daniel Field on 5/3/17. Stacey Quinones Patient was found to have stable disease.  Patient tolerated the procedure well and was taken to the telemetry floor for recovery. The following morning patient was up feeling well without any complaints of chest pain or shortness of breath. Patient's right femoral cath site was clean, dry and intact without hematoma or bruit. Patient's labs were WNL with Cr 2.43 . Patient was seen and examined by Dr. Eneida Padgett and determined stable and ready for discharge. Patient was instructed on the importance of medication compliance including taking aspirin everyday without missing a dose. For maximized medical therapy for CAD, patient will continue BB and statin but no ACE-I or ARB with underlying CKD. The patient will f/u in [facility] for CTA for TAVR workup then TAVR here with Dr. Tanner Bermudez. DISPOSITION: The patient is being discharged home in stable condition on a low saturated fat, low cholesterol and low salt diet. The patient is instructed to advance activities as tolerated to the limit of fatigue or shortness of breath. The patient is instructed to avoid all heavy lifting, straining, stooping or squatting for 3-5 days. The patient is instructed to watch the cath site for bleeding/oozing; if seen, the patient is instructed to apply firm pressure with a clean cloth and call [facility]iology at [phone]. The patient is instructed to watch for signs of infection which include: increasing area of redness, fever/hot to touch or purulent drainage at the catheterization site. The patient is instructed not to soak in a bathtub for 7-10 days, but is cleared to shower. The patient is instructed to call the office or return to the ER for immediate evaluation for any shortness of breath or chest pain not relieved by NTG.         Discharge Exam:   Visit Vitals    /90 (BP 1 Location: Left arm, BP Patient Position: At rest)    Pulse (!) 59    Temp 97 °F (36.1 °C)    Resp 18    Ht 5' 7.5\" (1.715 m)    Wt 85.2 kg (187 lb 14.4 oz)    SpO2 96%    BMI 28.99 kg/m2     Patient has been seen by Dr. Eneida Padgett: see his progress note for exam details. Recent Results (from the past 24 hour(s))   METABOLIC PANEL, BASIC    Collection Time: 05/04/17  5:00 AM   Result Value Ref Range    Sodium 144 136 - 145 mmol/L    Potassium 4.3 3.5 - 5.1 mmol/L    Chloride 110 (H) 98 - 107 mmol/L    CO2 25 21 - 32 mmol/L    Anion gap 9 7 - 16 mmol/L    Glucose 92 65 - 100 mg/dL    BUN 32 (H) 8 - 23 MG/DL    Creatinine 2.43 (H) 0.8 - 1.5 MG/DL    GFR est AA 32 (L) >60 ml/min/1.73m2    GFR est non-AA 27 (L) >60 ml/min/1.73m2    Calcium 8.1 (L) 8.3 - 10.4 MG/DL         Patient Instructions:   Current Discharge Medication List      CONTINUE these medications which have NOT CHANGED    Details   amiodarone (CORDARONE) 200 mg tablet Take 200 mg by mouth daily. metoprolol tartrate (LOPRESSOR) 25 mg tablet Take 1 Tab by mouth two (2) times a day. Qty: 60 Tab, Refills: 11      atorvastatin (LIPITOR) 20 mg tablet Take 10 mg by mouth daily. tamsulosin (FLOMAX) 0.4 mg capsule Take 0.4 mg by mouth daily. rivaroxaban (XARELTO) 15 mg tab tablet Take 1 Tab by mouth daily (with breakfast). Qty: 30 Tab, Refills: 11      diphenhydrAMINE (BENADRYL) 25 mg capsule Take 25 mg by mouth every six (6) hours as needed. aspirin delayed-release 81 mg tablet Take  by mouth daily. Signed:  JANNA Whitmore  5/4/2017  7:58 AM    ATTENDING ADDENDUM:    Patient seen and examined by me. Agree with above note by physician extender. Key findings are:  No CP or BARRETO, euvolemic and asymptomatic today. Ready to go home on above meds with quick outpatient follow up and continued evaluation for TAVR. CV- RRR with 2/6 RYAN RUSB without S2, no S3  Lungs- Clear bilaterally  Abd- soft, nontender, nondistended  Ext- no edema    Plan: As above.     Farhana Salinas MD  [address] Cardiology  Pager [phone]

## 2017-05-04 NOTE — PROCEDURES
[address]       Name:  Joanna Coyle   MR#:  [id number]   :  1928   Account #:  [de-identified]   Date of Adm:  2017       DATE OF PROCEDURE: 2017     CLINICAL INFORMATION: An 70-year-old gentleman with previous   CABG with worsening exertional shortness of breath. The patient   is in preevaluation for TAVR procedure. Left heart   catheterization recommended. PROCEDURE DETAILS: After informed consent was obtained, a 6-  Hungarian sheath was placed in the right femoral artery. A 6-Hungarian   JL4, JR4 and multipurpose catheters were used. Of note, the   patient has a very tortuous aorta and catheter manipulation was   difficult. Manual pressure will be used to gain hemostasis at   the cath site. 60 mL Optiray was used. Fifteen minutes conscious sedation was provided for and   supervised by myself with 1 mg Versed and 25 mcg fentanyl. Vital   signs stable and saturations were stable throughout. FINDINGS: No left ventriculogram was performed secondary to the   patient's chronic renal failure. The left main coronary is in   normal anatomic position, bifurcates into LAD and circumflex   system. There is no disease in the left main. The LAD has a 50% smooth mid vessel narrowing. The circumflex has a 95% ostial narrowing and the vessel is   occluded in the mid section. Right coronary is a small nondominant vessel free of any   significant narrowing. The LIMA to the LAD is atretic and does not reach the LAD. The vein graft to the ramus intermedius has approximately a 70%   distal body lesion. The runoff vessel is small and is smaller   than the area of narrowing and felt best managed with continued   medical therapy. The vein graft to OM1 and the left PDA is widely patent with   good proximal and distal anastomosis.  The distal obtuse marginal   branch is a very small, diffusely diseased vessel, less than 2   mm in diameter. The PDA back fills into the AV groove   circumflex. Manual pressure will be used to gain hemostasis at the cath   site. CONCLUSION:    1. 4/4 patent bypass grafts. 2. Severe native 3-vessel coronary artery disease.          Joaquin Kendall MD      [facility] / [facility] One[facility]   D:  05/04/2017   14:07   T:  05/04/2017   14:23   Job #:  [id number]

## 2017-05-04 NOTE — PROGRESS NOTES
Bedside and Verbal shift change report given to Olivier Brooke RN (oncoming nurse) by self (offgoing nurse). Report included the following information SBAR, Kardex, MAR and Recent Results.

## 2017-05-04 NOTE — DISCHARGE INSTRUCTIONS
Angiogram: What to Expect at Home  Your Recovery  An angiogram is an X-ray test that uses dye and a camera to take pictures of the blood flow in an artery or a vein. The doctor inserted a thin, flexible tube (catheter) into a blood vessel in your groin. In some cases, the catheter is placed in a blood vessel in the arm. An angiogram is done for many reasons. For example, you may have an angiogram to find the source of bleeding, such as an ulcer. Or it may be done to look for blocked blood vessels in your lungs. After an angiogram, your groin or arm may have a bruise and feel sore for a day or two. You can do light activities around the house but nothing strenuous for several days. Your doctor may give you specific instructions on when you can do your normal activities again, such as driving and going back to work. This care sheet gives you a general idea about how long it will take for you to recover. But each person recovers at a different pace. Follow the steps below to feel better as quickly as possible. How can you care for yourself at home? Activity  · Do not do strenuous exercise and do not lift, pull, or push anything heavy until your doctor says it is okay. This may be for a day or two. You can walk around the house and do light activity, such as cooking. · You may shower 24 to 48 hours after the procedure, if your doctor okays it. Pat the incision dry. Do not take a bath for 1 week, or until your doctor tells you it is okay. · If the catheter was placed in your groin, try not to walk up stairs for the first couple of days. · If the catheter was placed in your arm near your wrist, do not bend your wrist deeply for the first couple of days. Be careful using your hand to get into and out of a chair or bed. · If your doctor recommends it, get more exercise. Walking is a good choice. Bit by bit, increase the amount you walk every day.  Try for at least 30 minutes on most days of the week.  Diet  · Drink plenty of fluids to help your body flush out the dye. If you have kidney, heart, or liver disease and have to limit fluids, talk with your doctor before you increase the amount of fluids you drink. · You can eat your normal diet. If your stomach is upset, try bland, low-fat foods like plain rice, broiled chicken, toast, and yogurt. Medicines  · Be safe with medicines. Read and follow all instructions on the label. ¨ If the doctor gave you a prescription medicine for pain, take it as prescribed. ¨ If you are not taking a prescription pain medicine, ask your doctor if you can take an over-the-counter medicine. · If you take blood thinners, such as warfarin (Coumadin), clopidogrel (Plavix), or aspirin, be sure to talk to your doctor. He or she will tell you if and when to start taking those medicines again. Make sure that you understand exactly what your doctor wants you to do. · Your doctor will tell you if and when you can restart your medicines. He or she will also give you instructions about taking any new medicines. Care of the catheter site  · You will have a dressing over the cut (incision). A dressing helps the incision heal and protects it. Your doctor will tell you how to take care of this. · Put ice or a cold pack on the area for 10 to 20 minutes at a time to help with soreness or swelling. Put a thin cloth between the ice and your skin. Follow-up care is a key part of your treatment and safety. Be sure to make and go to all appointments, and call your doctor if you are having problems. It's also a good idea to know your test results and keep a list of the medicines you take. When should you call for help? Call 911 anytime you think you may need emergency care. For example, call if:  · You passed out (lost consciousness). · You have severe trouble breathing. · You have sudden chest pain and shortness of breath, or you cough up blood.   Call your doctor now or seek immediate medical care if:  · You are bleeding from the area where the catheter was put in your artery. · You have a fast-growing, painful lump at the catheter site. · You have signs of infection, such as:  ¨ Increased pain, swelling, warmth, or redness. ¨ Red streaks leading from the incision. ¨ Pus draining from the incision. ¨ A fever. Watch closely for any changes in your health, and be sure to contact your doctor if:  · You don't get better as expected. Where can you learn more? Go to http://leslie-get.info/. Enter O470 in the search box to learn more about \"Angiogram: What to Expect at Home. \"  Current as of: October 14, 2016  Content Version: 11.2  © 8224-1883 [employer]. Care instructions adapted under license by [employer] (which disclaims liability or warranty for this information). If you have questions about a medical condition or this instruction, always ask your healthcare professional. [place] [address] any warranty or liability for your use of this information. DISCHARGE SUMMARY from Nurse    The following personal items are in your possession at time of discharge:    Dental Appliances: Partials, With patient  Visual Aid: None     Home Medications: Sent to pharmacy  Jewelry: None  Clothing: At bedside, Footwear, Pants, Shirt  Other Valuables: None             PATIENT INSTRUCTIONS:    After general anesthesia or intravenous sedation, for 24 hours or while taking prescription Narcotics:  · Limit your activities  · Do not drive and operate hazardous machinery  · Do not make important personal or business decisions  · Do  not drink alcoholic beverages  · If you have not urinated within 8 hours after discharge, please contact your surgeon on call.     Report the following to your surgeon:  · Excessive pain, swelling, redness or odor of or around the surgical area  · Temperature over 100.5  · Nausea and vomiting lasting longer than 4 hours or if unable to take medications  · Any signs of decreased circulation or nerve impairment to extremity: change in color, persistent  numbness, tingling, coldness or increase pain  · Any questions            *  Please give a list of your current medications to your Primary Care Provider. *  Please update this list whenever your medications are discontinued, doses are      changed, or new medications (including over-the-counter products) are added. *  Please carry medication information at all times in case of emergency situations. These are general instructions for a healthy lifestyle:    No smoking/ No tobacco products/ Avoid exposure to second hand smoke    Surgeon General's Warning:  Quitting smoking now greatly reduces serious risk to your health. Obesity, smoking, and sedentary lifestyle greatly increases your risk for illness    A healthy diet, regular physical exercise & weight monitoring are important for maintaining a healthy lifestyle    You may be retaining fluid if you have a history of heart failure or if you experience any of the following symptoms:  Weight gain of 3 pounds or more overnight or 5 pounds in a week, increased swelling in our hands or feet or shortness of breath while lying flat in bed. Please call your doctor as soon as you notice any of these symptoms; do not wait until your next office visit. Recognize signs and symptoms of STROKE:    F-face looks uneven    A-arms unable to move or move unevenly    S-speech slurred or non-existent    T-time-call 911 as soon as signs and symptoms begin-DO NOT go       Back to bed or wait to see if you get better-TIME IS BRAIN. Warning Signs of HEART ATTACK     Call 911 if you have these symptoms:   Chest discomfort. Most heart attacks involve discomfort in the center of the chest that lasts more than a few minutes, or that goes away and comes back.  It can feel like uncomfortable pressure, squeezing, fullness, or pain.   Discomfort in other areas of the upper body. Symptoms can include pain or discomfort in one or both arms, the back, neck, jaw, or stomach.  Shortness of breath with or without chest discomfort.  Other signs may include breaking out in a cold sweat, nausea, or lightheadedness. Don't wait more than five minutes to call 911 - MINUTES MATTER! Fast action can save your life. Calling 911 is almost always the fastest way to get lifesaving treatment. Emergency Medical Services staff can begin treatment when they arrive -- up to an hour sooner than if someone gets to the hospital by car. The discharge information has been reviewed with the patient. The patient verbalized understanding. Discharge medications reviewed with the patient and appropriate educational materials and side effects teaching were provided.

## 2017-06-26 NOTE — IP AVS SNAPSHOT
Melodie Silverio 
 
 
 [facility] [address] [facility] 
[phone] Patient: Tyler Wren 
MRN: [id number] ROSANNA:[id number] You are allergic to the following No active allergies Recent Documentation Weight BMI Smoking Status 90.2 kg 31.15 kg/m2 Former Smoker Unresulted Labs Order Current Status TYPE & CROSSMATCH Preliminary result Emergency Contacts Name Discharge Info Relation Home Work Mobile Merlyn Sellers DISCHARGE CAREGIVER [3] Spouse [3] [phone] About your hospitalization You were admitted on:  June 26, 2017 You last received care in the:  [facility] 2 CV STEPDOWN You were discharged on:  June 29, 2017 Unit phone number:  [phone] Why you were hospitalized Your primary diagnosis was:  S/P Tavr (Transcatheter Aortic Valve Replacement) Your diagnoses also included:  Atrial Fibrillation (Hcc), Cad (Coronary Artery Disease), Ckd (Chronic Kidney Disease) Stage 4, Gfr 15-29 Ml/Min (Hcc), Dyslipidemia, Nonrheumatic Aortic Valve Stenosis, Aortic Stenosis, Systolic Chf, Acute (Hcc) Providers Seen During Your Hospitalizations Provider Role Specialty Primary office phone Marino Murray MD Attending Provider Cardiology [phone] Your Primary Care Physician (PCP) Primary Care Physician Office Phone Office Fax Jermaine Lav [phone] Follow-up Information Follow up With Details Comments Contact Info Marino Murray MD On 7/7/2017 Follow up on July 7th at 12:00pm THE [facility]  [address] [facility], [place], operated by [facility] [address] 
[phone] [address]  [address] 
[phone] [facility] Cardiology On 7/26/2017 at 2:30pm for an echo and then 4:00pm for labs on the first floor [address] [address] [facility] 
[phone] Margie Clemons MD On 7/26/2017 at 4:30pm to see Dr. Lluvia Regalado [address] [facility] [address] 
[phone] Your Appointments Friday July 07, 2017 12:00 PM EDT HOSPITAL FOLLOW-UP with Margie Clemons MD  
[address] Cardiology ([address] [place] [address]) [address] 
[address]  
[phone] Wednesday July 26, 2017  2:30 PM EDT  
ECHOCARDIOGRAM with ECHO 36 [address] Cardiology ([address] [place] [address]) [address] 
[address]  
[phone] Wednesday July 26, 2017  4:30 PM EDT TransAortic Valve Replacement Follow up with Margie Clemons MD  
[address] Cardiology ([address] [place] [address]) [address] 
[address]  
[phone] Current Discharge Medication List  
  
START taking these medications Dose & Instructions Dispensing Information Comments Morning Noon Evening Bedtime  
 furosemide 40 mg tablet Commonly known as:  LASIX Your next dose is:  As needed Dose:  40 mg Take 1 Tab by mouth daily as needed. Quantity:  30 Tab Refills:  2 CONTINUE these medications which have NOT CHANGED Dose & Instructions Dispensing Information Comments Morning Noon Evening Bedtime  
 amiodarone 200 mg tablet Commonly known as:  CORDARONE Your next dose is:  Tomorrow Dose:  200 mg Take 200 mg by mouth daily. Refills:  0  
     
  
   
   
   
  
 aspirin delayed-release 81 mg tablet Your next dose is:  Tomorrow Take  by mouth daily. Refills:  0  
     
  
   
   
   
  
 atorvastatin 20 mg tablet Commonly known as:  LIPITOR Your next dose is: Today Dose:  10 mg Take 10 mg by mouth daily. Refills:  0  
     
   
   
   
  
  
 diphenhydrAMINE 25 mg capsule Commonly known as:  BENADRYL Your next dose is:  As needed Dose:  25 mg Take 25 mg by mouth every six (6) hours as needed. Refills:  0  
     
   
   
   
  
 metoprolol tartrate 25 mg tablet Commonly known as:  LOPRESSOR Your next dose is: Today Dose:  25 mg Take 1 Tab by mouth two (2) times a day. Quantity:  60 Tab Refills:  11  
     
   
   
  
   
  
 rivaroxaban 15 mg Tab tablet Commonly known as:  Fleta Hensen Your next dose is:  Tomorrow Dose:  15 mg Take 1 Tab by mouth daily (with breakfast). Quantity:  30 Tab Refills:  11  
     
  
   
   
   
  
 tamsulosin 0.4 mg capsule Commonly known as:  FLOMAX Your next dose is: Today Dose:  0.4 mg Take 0.4 mg by mouth daily. Refills:  0  
     
   
   
  
   
  
 VITAMIN D3 1,000 unit Cap Generic drug:  cholecalciferol Your next dose is:  Tomorrow Take  by mouth daily. Refills:  0 Where to Get Your Medications Information on where to get these meds will be given to you by the nurse or doctor. ! Ask your nurse or doctor about these medications  
  furosemide 40 mg tablet Discharge Instructions High Blood Pressure: Care Instructions Your Care Instructions If your blood pressure is usually above 140/90, you have high blood pressure, or hypertension. That means the top number is 140 or higher or the bottom number is 90 or higher, or both. Despite what a lot of people think, high blood pressure usually doesn't cause headaches or make you feel dizzy or lightheaded. It usually has no symptoms. But it does increase your risk for heart attack, stroke, and kidney or eye damage. The higher your blood pressure, the more your risk increases. Your doctor will give you a goal for your blood pressure. Your goal will be based on your health and your age. An example of a goal is to keep your blood pressure below 140/90. Lifestyle changes, such as eating healthy and being active, are always important to help lower blood pressure. You might also take medicine to reach your blood pressure goal. 
Follow-up care is a key part of your treatment and safety. Be sure to make and go to all appointments, and call your doctor if you are having problems. It's also a good idea to know your test results and keep a list of the medicines you take. How can you care for yourself at home? Medical treatment · If you stop taking your medicine, your blood pressure will go back up. You may take one or more types of medicine to lower your blood pressure. Be safe with medicines. Take your medicine exactly as prescribed. Call your doctor if you think you are having a problem with your medicine. · Talk to your doctor before you start taking aspirin every day. Aspirin can help certain people lower their risk of a heart attack or stroke. But taking aspirin isn't right for everyone, because it can cause serious bleeding. · See your doctor regularly. You may need to see the doctor more often at first or until your blood pressure comes down. · If you are taking blood pressure medicine, talk to your doctor before you take decongestants or anti-inflammatory medicine, such as ibuprofen. Some of these medicines can raise blood pressure. · Learn how to check your blood pressure at home. Lifestyle changes · Stay at a healthy weight. This is especially important if you put on weight around the waist. Losing even 10 pounds can help you lower your blood pressure. · If your doctor recommends it, get more exercise. Walking is a good choice. Bit by bit, increase the amount you walk every day. Try for at least 30 minutes on most days of the week. You also may want to swim, bike, or do other activities. · Avoid or limit alcohol. Talk to your doctor about whether you can drink any alcohol.  
· Try to limit how much sodium you eat to less than 2,300 milligrams (mg) a day. Your doctor may ask you to try to eat less than 1,500 mg a day. · Eat plenty of fruits (such as bananas and oranges), vegetables, legumes, whole grains, and low-fat dairy products. · Lower the amount of saturated fat in your diet. Saturated fat is found in animal products such as milk, cheese, and meat. Limiting these foods may help you lose weight and also lower your risk for heart disease. · Do not smoke. Smoking increases your risk for heart attack and stroke. If you need help quitting, talk to your doctor about stop-smoking programs and medicines. These can increase your chances of quitting for good. When should you call for help? Call 911 anytime you think you may need emergency care. This may mean having symptoms that suggest that your blood pressure is causing a serious heart or blood vessel problem. Your blood pressure may be over 180/110. For example, call 911 if: 
· You have symptoms of a heart attack. These may include: ¨ Chest pain or pressure, or a strange feeling in the chest. 
¨ Sweating. ¨ Shortness of breath. ¨ Nausea or vomiting. ¨ Pain, pressure, or a strange feeling in the back, neck, jaw, or upper belly or in one or both shoulders or arms. ¨ Lightheadedness or sudden weakness. ¨ A fast or irregular heartbeat. · You have symptoms of a stroke. These may include: 
¨ Sudden numbness, tingling, weakness, or loss of movement in your face, arm, or leg, especially on only one side of your body. ¨ Sudden vision changes. ¨ Sudden trouble speaking. ¨ Sudden confusion or trouble understanding simple statements. ¨ Sudden problems with walking or balance. ¨ A sudden, severe headache that is different from past headaches. · You have severe back or belly pain. Do not wait until your blood pressure comes down on its own. Get help right away.  
Call your doctor now or seek immediate care if: 
· Your blood pressure is much higher than normal (such as 180/110 or higher), but you don't have symptoms. · You think high blood pressure is causing symptoms, such as: ¨ Severe headache. ¨ Blurry vision. Watch closely for changes in your health, and be sure to contact your doctor if: 
· Your blood pressure measures 140/90 or higher at least 2 times. That means the top number is 140 or higher or the bottom number is 90 or higher, or both. · You think you may be having side effects from your blood pressure medicine. · Your blood pressure is usually normal, but it goes above normal at least 2 times. Where can you learn more? Go to http://leslie-get.info/. Enter T146 in the search box to learn more about \"High Blood Pressure: Care Instructions. \" Current as of: August 8, 2016 Content Version: 11.3 © 7665-1789 [employer]. Care instructions adapted under license by [employer] (which disclaims liability or warranty for this information). If you have questions about a medical condition or this instruction, always ask your healthcare professional. [place] [address] any warranty or liability for your use of this information. Atrial Fibrillation: Care Instructions Your Care Instructions Atrial fibrillation is an irregular and often fast heartbeat. Treating this condition is important for several reasons. It can cause blood clots, which can travel from your heart to your brain and cause a stroke. If you have a fast heartbeat, you may feel lightheaded, dizzy, and weak. An irregular heartbeat can also increase your risk for heart failure. Atrial fibrillation is often the result of another heart condition, such as high blood pressure or coronary artery disease. Making changes to improve your heart condition will help you stay healthy and active. Follow-up care is a key part of your treatment and safety.  Be sure to make and go to all appointments, and call your doctor if you are having problems. It's also a good idea to know your test results and keep a list of the medicines you take. How can you care for yourself at home? Medicines · Take your medicines exactly as prescribed. Call your doctor if you think you are having a problem with your medicine. You will get more details on the specific medicines your doctor prescribes. · If your doctor has given you a blood thinner to prevent a stroke, be sure you get instructions about how to take your medicine safely. Blood thinners can cause serious bleeding problems. · Do not take any vitamins, over-the-counter drugs, or herbal products without talking to your doctor first. 
Lifestyle changes · Do not smoke. Smoking can increase your chance of a stroke and heart attack. If you need help quitting, talk to your doctor about stop-smoking programs and medicines. These can increase your chances of quitting for good. · Eat a heart-healthy diet. · Stay at a healthy weight. Lose weight if you need to. · Limit alcohol to 2 drinks a day for men and 1 drink a day for women. Too much alcohol can cause health problems. · Avoid colds and flu. Get a pneumococcal vaccine shot. If you have had one before, ask your doctor whether you need another dose. Get a flu shot every year. If you must be around people with colds or flu, wash your hands often. Activity · If your doctor recommends it, get more exercise. Walking is a good choice. Bit by bit, increase the amount you walk every day. Try for at least 30 minutes on most days of the week. You also may want to swim, bike, or do other activities. Your doctor may suggest that you join a cardiac rehabilitation program so that you can have help increasing your physical activity safely. · Start light exercise if your doctor says it is okay. Even a small amount will help you get stronger, have more energy, and manage stress. Walking is an easy way to get exercise.  Start out by walking a little more than you did in the hospital. Gradually increase the amount you walk. · When you exercise, watch for signs that your heart is working too hard. You are pushing too hard if you cannot talk while you are exercising. If you become short of breath or dizzy or have chest pain, sit down and rest immediately. · Check your pulse regularly. Place two fingers on the artery at the palm side of your wrist, in line with your thumb. If your heartbeat seems uneven or fast, talk to your doctor. When should you call for help? Call 911 anytime you think you may need emergency care. For example, call if: 
· You have symptoms of a heart attack. These may include: ¨ Chest pain or pressure, or a strange feeling in the chest. 
¨ Sweating. ¨ Shortness of breath. ¨ Nausea or vomiting. ¨ Pain, pressure, or a strange feeling in the back, neck, jaw, or upper belly or in one or both shoulders or arms. ¨ Lightheadedness or sudden weakness. ¨ A fast or irregular heartbeat. After you call 911, the  may tell you to chew 1 adult-strength or 2 to 4 low-dose aspirin. Wait for an ambulance. Do not try to drive yourself. · You have symptoms of a stroke. These may include: 
¨ Sudden numbness, tingling, weakness, or loss of movement in your face, arm, or leg, especially on only one side of your body. ¨ Sudden vision changes. ¨ Sudden trouble speaking. ¨ Sudden confusion or trouble understanding simple statements. ¨ Sudden problems with walking or balance. ¨ A sudden, severe headache that is different from past headaches. · You passed out (lost consciousness). Call your doctor now or seek immediate medical care if: 
· You have new or increased shortness of breath. · You feel dizzy or lightheaded, or you feel like you may faint. · Your heart rate becomes irregular. · You can feel your heart flutter in your chest or skip heartbeats. Tell your doctor if these symptoms are new or worse. Watch closely for changes in your health, and be sure to contact your doctor if you have any problems. Where can you learn more? Go to http://leslie-get.info/. Enter U020 in the search box to learn more about \"Atrial Fibrillation: Care Instructions. \" Current as of: September 21, 2016 Content Version: 11.3 © 4346-1276 [employer] system. Care instructions adapted under license by [employer] (which disclaims liability or warranty for this information). If you have questions about a medical condition or this instruction, always ask your healthcare professional. [address] any warranty or liability for your use of this information. Rivaroxaban (By mouth) Rivaroxaban ([id number]-ban) Treats and prevents blood clots, which lowers the risk of stroke, deep vein thrombosis (DVT), pulmonary embolism (PE), and similar conditions. This medicine is a blood thinner. Brand Name(s): Xarelto, Xarelto Starter Pack There may be other brand names for this medicine. When This Medicine Should Not Be Used: This medicine is not right for everyone. Do not use it if you had an allergic reaction to rivaroxaban, or you have severe bleeding. How to Use This Medicine:  
Tablet · Take this medicine as directed, and take it at the same time each day. · 10-milligram (mg) tablet: Take with or without food. · 15-mg or 20-mg tablet: Take with food. · If you cannot swallow the tablets, you may crush the tablet and mix it with applesauce. Eat some food after you swallow the mixture. · Tube feeding: You may crush the tablet and mix the medicine in 50 milliliters (mL) of water before giving it via the tube. This must be followed by a feeding. · This medicine should come with a Medication Guide. Ask your pharmacist for a copy if you do not have one. · Missed dose: ¨ Ask your doctor or pharmacist if you are not sure what to do if you miss a dose. ¨ Once-daily dose: If you miss a dose or forget to use your medicine, use it as soon as you can on the same day. Do not use extra medicine to make up for a missed dose. ¨ Twice-daily dose to treat a blood clot (15-mg tablet): If you miss a dose or forget to use your medicine, use it as soon as you can on the same day. You may take 2 doses at the same time to make up for the missed dose. This is only for people who take a total of 30 mg per day. · Store the medicine in a closed container at room temperature, away from heat, moisture, and direct light. Drugs and Foods to Avoid: Ask your doctor or pharmacist before using any other medicine, including over-the-counter medicines, vitamins, and herbal products. · Some foods and medicines can affect how rivaroxaban works. Tell your doctor if you are using any of the following: ¨ NSAID medicine (including aspirin, celecoxib, diclofenac, ibuprofen, naproxen) ¨ Ketoconazole, itraconazole, lopinavir, ritonavir, indinavir, conivaptan, carbamazepine, phenytoin, rifampin, Radha's wort ¨ Another blood thinner (including clopidogrel, enoxaparin, heparin, warfarin) Warnings While Using This Medicine: · Tell your doctor if you are pregnant or breastfeeding, or if you have kidney disease, liver disease, bleeding problems, or an artificial heart valve. · This medicine may increase your risk of bleeding. Be careful to avoid injuries that could cause bleeding. Stay away from rough sports or other situations where you could be bruised, cut, or hurt. Brush and floss your teeth gently. Be careful when using sharp objects, including razors and fingernail clippers. Avoid picking your nose. If you need to blow your nose, blow it gently. · This medicine may cause nerve damage if you have a medical procedure done to your back, including anesthesia or a spinal puncture.  This is more likely to happen if you have a history of back injury, back surgery, problems with your spine, or procedures or punctures to your back. Tell your doctor if you are also taking another blood thinner, because this also increases the risk. · Do not stop using this medicine suddenly without asking your doctor. You might have a higher risk of stroke for a short time after you stop using this medicine. · Tell any doctor or dentist who treats you that you are using this medicine. · Your doctor will do lab tests at regular visits to check on the effects of this medicine. Keep all appointments. · Keep all medicine out of the reach of children. Never share your medicine with anyone. Possible Side Effects While Using This Medicine:  
Call your doctor right away if you notice any of these side effects: · Allergic reaction: Itching or hives, swelling in your face or hands, swelling or tingling in your mouth or throat, chest tightness, trouble breathing · Blistering, peeling, or red skin rash · Decrease in how much or how often you urinate · Heavy menstrual bleeding, or vaginal bleeding · Red or brown urine, bloody or black, tarry stools · Unusual bleeding or bruising, including frequent nosebleeds · Vomiting blood or material that looks like coffee grounds If you notice other side effects that you think are caused by this medicine, tell your doctor. Call your doctor for medical advice about side effects. You may report side effects to FDA at 8-386-FDA-5917 © 2017 [address] Information is for End User's use only and may not be sold, redistributed or otherwise used for commercial purposes. The above information is an  only. It is not intended as medical advice for individual conditions or treatments. Talk to your doctor, nurse or pharmacist before following any medical regimen to see if it is safe and effective for you. Heart-Healthy Diet: Care Instructions Your Care Instructions A heart-healthy diet has lots of vegetables, fruits, nuts, beans, and whole grains, and is low in salt. It limits foods that are high in saturated fat, such as meats, cheeses, and fried foods. It may be hard to change your diet, but even small changes can lower your risk of heart attack and heart disease. Follow-up care is a key part of your treatment and safety. Be sure to make and go to all appointments, and call your doctor if you are having problems. It's also a good idea to know your test results and keep a list of the medicines you take. How can you care for yourself at home? Watch your portions · Learn what a serving is. A \"serving\" and a \"portion\" are not always the same thing. Make sure that you are not eating larger portions than are recommended. For example, a serving of pasta is ½ cup. A serving size of meat is 2 to 3 ounces. A 3-ounce serving is about the size of a deck of cards. Measure serving sizes until you are good at [place]" them. Keep in mind that restaurants often serve portions that are 2 or 3 times the size of one serving. · To keep your energy level up and keep you from feeling hungry, eat often but in smaller portions. · Eat only the number of calories you need to stay at a healthy weight. If you need to lose weight, eat fewer calories than your body burns (through exercise and other physical activity). Eat more fruits and vegetables · Eat a variety of fruit and vegetables every day. Dark green, deep orange, red, or yellow fruits and vegetables are especially good for you. Examples include spinach, carrots, peaches, and berries. · Keep carrots, celery, and other veggies handy for snacks. Buy fruit that is in season and store it where you can see it so that you will be tempted to eat it. · Cook dishes that have a lot of veggies in them, such as stir-fries and soups. Limit saturated and trans fat · Read food labels, and try to avoid saturated and trans fats.  They increase your risk of heart disease. Trans fat is found in many processed foods such as cookies and crackers. · Use olive or canola oil when you cook. Try cholesterol-lowering spreads, such as Benecol or Take Control. · Bake, broil, grill, or steam foods instead of frying them. · Choose lean meats instead of high-fat meats such as hot dogs and sausages. Cut off all visible fat when you prepare meat. · Eat fish, skinless poultry, and meat alternatives such as soy products instead of high-fat meats. Soy products, such as tofu, may be especially good for your heart. · Choose low-fat or fat-free milk and dairy products. Eat fish · Eat at least two servings of fish a week. Certain fish, such as salmon and tuna, contain omega-3 fatty acids, which may help reduce your risk of heart attack. Eat foods high in fiber · Eat a variety of grain products every day. Include whole-grain foods that have lots of fiber and nutrients. Examples of whole-grain foods include oats, whole wheat bread, and brown rice. · Buy whole-grain breads and cereals, instead of white bread or pastries. Limit salt and sodium · Limit how much salt and sodium you eat to help lower your blood pressure. · Taste food before you salt it. Add only a little salt when you think you need it. With time, your taste buds will adjust to less salt. · Eat fewer snack items, fast foods, and other high-salt, processed foods. Check food labels for the amount of sodium in packaged foods. · Choose low-sodium versions of canned goods (such as soups, vegetables, and beans). Limit sugar · Limit drinks and foods with added sugar. These include candy, desserts, and soda pop. Limit alcohol · Limit alcohol to no more than 2 drinks a day for men and 1 drink a day for women. Too much alcohol can cause health problems. When should you call for help? Watch closely for changes in your health, and be sure to contact your doctor if: · You would like help planning heart-healthy meals. Where can you learn more? Go to http://leslie-get.info/. Enter V137 in the search box to learn more about \"Heart-Healthy Diet: Care Instructions. \" Current as of: April 3, 2017 Content Version: 11.3 © 2829-8050 [employer]. Care instructions adapted under license by [employer] (which disclaims liability or warranty for this information). If you have questions about a medical condition or this instruction, always ask your healthcare professional. [address] any warranty or liability for your use of this information. Learning About Transcatheter Aortic Valve Replacement (TAVR) What is TAVR? Transcatheter aortic valve replacement (TAVR) is a procedure that replaces the aortic heart valve. It is done to treat aortic valve stenosis. In aortic valve stenosis, the valve between your heart and the large blood vessel that carries blood to the body (aorta) has narrowed. That forces the heart to pump harder to get enough blood through the valve. In TAVR, the doctor uses a catheter to put in the new heart valve. Open-heart surgery is not done. TAVR is a newer procedure. How well it works long-term is not known yet. And TAVR can cause serious problems. These include stroke, a heart attack during the procedure, or even death. TAVR may be a good option for a person who cannot have surgery or for a person who has a high risk of serious problems from open-heart surgery. For example, you might think about TAVR if you are not healthy enough for an open-heart surgery. TAVR may not be a good choice if an open-heart surgery is likely to be successful. A team of doctors will use professional guidelines to decide whether or not TAVR is a good choice for you. How is TAVR done? TAVR is often done through an incision (cut) in the groin.  But sometimes a small cut is made in the chest. The doctor uses a tube called a catheter and special tools that fit inside the catheter. The doctor puts the catheter into a blood vessel and moves it through the blood vessel and into the heart. A specially designed artificial valve fits inside the catheter. The doctor then moves the new valve into the damaged aortic valve. The artificial valve expands and takes the place of the damaged aortic valve. Most people will be asleep for the procedure. The surgery usually takes about 2 to 3 hours. You will have to stay in the hospital for several days after the procedure. What can you expect after TAVR? · While you are in the hospital, your doctors and nurses will monitor you to check how the new valve is working. · You will receive information from the hospital about diet, activities, and medicine. · You will need to have regular checkups with your doctor. · When you leave the hospital, your doctor may give you a blood thinner for a few months to prevent blood clots. If you get a blood thinner, be sure you get instructions about how to take your medicine safely. Blood thinners can cause serious bleeding problems. Follow-up care is a key part of your treatment and safety. Be sure to make and go to all appointments, and call your doctor if you are having problems. It's also a good idea to know your test results and keep a list of the medicines you take. Where can you learn more? Go to http://leslie-get.info/. Enter V384 in the search box to learn more about \"Learning About Transcatheter Aortic Valve Replacement (TAVR). \" 
Current as of: April 3, 2017 Content Version: 11.3 © 9779-1076 [employer]. Care instructions adapted under license by [employer] (which disclaims liability or warranty for this information).  If you have questions about a medical condition or this instruction, always ask your healthcare professional. [employer] disclaims any warranty or liability for your use of this information. Do not lift, push or pull anything heavier than 10 lbs for the next 2 weeks. You should also avoid any straining, stooping or squatting for 2 weeks. Do not drive for 1 week. If your groin site starts bleeding or oozing, apply firm pressure with a clean cloth and call [facility] at [phone]. You should watch for signs of infection such as increasing areas of redness, fever, or purulent drainage. If you see anything concerning, please call our office. If you experience any severe pain, numbness, color change or temperature change in your leg, please return to the Emergency Room for immediate evaluation. All patients with prosthetic valves, including those who have undergone TAVR, are considered among those at highest risk for endocarditis (infection of a heart valve). You may need to take antibiotics before certain procedures to prevent infection. Please call our office before you have any dental procedures or oral surgery. Discharge Orders Procedure Order Date Status Priority Quantity Spec Type Associated Dx METABOLIC PANEL, BASIC 99/85/24 2894 Future Routine 1 Blood METABOLIC PANEL, BASIC 64/88/96 0847 Future Routine 1 Blood Comments:  Diastolic CHF  
  
[employer] Transitions of Care Introducing [employer] [address] offers a voluntary care coordination program to provide high quality service and care to [facility] fee-for-service beneficiaries. [facility] [place] was designed to help you enhance your health and well-being through the following services: ? Transitions of Care  support for individuals who are transitioning from one care setting to another (example: Hospital to home). ?  Chronic and Complex Care Coordination  support for individuals and caregivers of those with serious or chronic illnesses or with more than one chronic (ongoing) condition and those who take a number of different medications. If you meet specific medical criteria, a [address] may call you directly to coordinate your care with your primary care physician and your other care providers. For questions about the [facility] programs, please, contact your physicians office. For general questions or additional information about Accountable Care Organizations: 
Please visit www.medicare.gov/acos. html or call 1-800-MEDICARE (2-338.703.8636) TTY users should call 1-631.339.6710. Zadspace Announcement We are excited to announce that we are making your provider's discharge notes available to you in Zadspace. You will see these notes when they are completed and signed by the physician that discharged you from your recent hospital stay. If you have any questions or concerns about any information you see in Zadspace, please call the Health Information Department where you were seen or reach out to your Primary Care Provider for more information about your plan of care. General Information Please provide this summary of care documentation to your next provider. Patient Signature:  ____________________________________________________________ Date:  ____________________________________________________________  
  
Hermelinda Burn Provider Signature:  ____________________________________________________________ Date:  ____________________________________________________________

## 2017-06-26 NOTE — PROGRESS NOTES
Pt arrived to unit. Monitor put on patient. Dual skin assessment completed. No redness or breakdown noted on sacral area.

## 2017-06-26 NOTE — ANESTHESIA PREPROCEDURE EVALUATION
Anesthetic History   No history of anesthetic complications            Review of Systems / Medical History  Patient summary reviewed, nursing notes reviewed and pertinent labs reviewed    Pulmonary          Shortness of breath         Neuro/Psych   Within defined limits           Cardiovascular    Hypertension: well controlled  Valvular problems/murmurs: aortic stenosis      Dysrhythmias : atrial fibrillation  CAD and CABG    Exercise tolerance: <4 METS  Comments: Was on Xarelto until Saturday    Rheumatic AS and MS (most recent ECHO only discusses MV thickened)    Echo:  LVH, diastolicdysfunction, severe AS with calculated valve area 0.73cm^2, mean gradient 28 mmHg    Cath:  LIMA to LAD is atretic, vein grafts to OM1, PDA, and ramus are open    Inc SOB over last 6 months       GI/Hepatic/Renal     GERD: well controlled           Endo/Other        Arthritis     Other Findings   Comments:           Physical Exam    Airway  Mallampati: II  TM Distance: 4 - 6 cm  Neck ROM: normal range of motion   Mouth opening: Normal     Cardiovascular    Rhythm: irregular  Rate: normal    Murmur: Grade 3, Aortic area     Dental    Dentition: Upper partial plate     Pulmonary  Breath sounds clear to auscultation               Abdominal  GI exam deferred       Other Findings            Anesthetic Plan    ASA: 4  Anesthesia type: general    Monitoring Plan: Arterial line, CVP and JONAS      Induction: Intravenous  Anesthetic plan and risks discussed with: Patient

## 2017-06-26 NOTE — H&P
[facility] CARDIOLOGY History &Physical                 Primary Cardiologist: Dr. Rina Bryant    Primary Care Physician: Dr. Geronimo Muñiz    Admitting Physician: Dr. Sophia Islas:     Patient is a 80 y.o. male who was seen in the office by Dr. Chery Stewart in consultation for severe aortic stenosis. He has NYHA Class III heart failure symptoms. Dobutamine stress echo showed a marked increase in his gradient with only 10 mcg of dobutamine. He was referred to [facility] and underwent CT for workup for transcatheter aortic valve replacement. He was felt to be a candidate for TAVR. He was previously evaluated by CT surgery and felt to be high risk for surgical AVR. It was felt that TAVR procedure would improve his quality of life. He has a life expectancy of greater than 1 year.        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    Esophageal reflux 8/24/2015    Essential hypertension, benign 8/24/2015    GERD (gastroesophageal reflux disease) 8/5/2016    HLD (hyperlipidemia) other unsp Dyslipidemia  8/5/2016    HTN - controlled, benign 8/5/2016    Malaise/fatigue/weakness/tiredness 8/5/2016    Mitral valve stenosis and aortic valve stenosis 8/24/2015    Other and unspecified hyperlipidemia 8/24/2015    Other malaise and fatigue 8/24/2015    Peripheral vascular disease ([facility] [address]     Renal artery stenosis (HCC)     Rheumatic aortic stenosis 8/5/2016    S/P CABG  8/5/2016    Shortness of breath 8/24/2015    Tachycardia 8/5/2016    Tachycardia, unspecified 8/24/2015      Past Surgical History:   Procedure Laterality Date    CARDIAC SURG PROCEDURE UNLIST      CABG     HX CHOLECYSTECTOMY      HX HEENT      cataract bilat    HX HERNIA REPAIR      HX KNEE REPLACEMENT        No Known Allergies  Social History   Substance Use Topics    Smoking status: Former Smoker     Quit date: 1/1/1965    Smokeless tobacco: Never Used    Alcohol use 1.2 oz/week     2 Glasses of wine per week      Comment: daily      FH: No family history on file. Review of Systems   Constitution: Positive for malaise/fatigue. Negative for chills, fever, weakness, weight gain and weight loss. HENT: Negative for ear pain, headaches, hearing loss, nosebleeds, sore throat and tinnitus. Eyes: Negative for blurred vision, vision loss in left eye and vision loss in right eye. Cardiovascular: Positive for dyspnea on exertion. Negative for chest pain, leg swelling, near-syncope, orthopnea, palpitations, paroxysmal nocturnal dyspnea and syncope. Respiratory: Negative for cough, hemoptysis, shortness of breath, sputum production and wheezing. Endocrine: Negative for cold intolerance, heat intolerance and polydipsia. Hematologic/Lymphatic: Does not bruise/bleed easily. Skin: Negative for color change and rash. Musculoskeletal: Positive for arthritis. Negative for back pain, joint pain, joint swelling and myalgias. Gastrointestinal: Negative for abdominal pain, constipation, diarrhea, dysphagia, heartburn, hematemesis, melena, nausea and vomiting. Genitourinary: Negative for dysuria, frequency, hematuria and urgency. Neurological: Negative for difficulty with concentration, dizziness, light-headedness, numbness, paresthesias, seizures and vertigo. Psychiatric/Behavioral: Negative for altered mental status and depression.          Objective:     Physical Exam:  General: Well Developed, Well Nourished, No Acute Distress  HEENT: pupils equal and round, no abnormalities noted  Neck: supple, no JVD  Heart: S1S2 with RRR with grade III/VI RYAN  Lungs: Clear throughout auscultation bilaterally  Abd: soft, nontender, nondistended, with good bowel sounds  Ext: warm, no edema, calves supple/nontender, pulses 2+ bilaterally  Skin: warm and dry  Psychiatric: Normal mood and affect  Neurologic: Alert and oriented X 3      ECG: Pending    Data Review: Pending    CXR: Pending    Assessment/Plan:   Principal Problem:    Nonrheumatic aortic valve stenosis (8/24/2015)  For TAVR in AM    Active Problems:    Dyslipidemia (3/16/2016)  Continue statin      Atrial fibrillation ([address] (1/18/2017)  Last dose of Xarelto was Saturday, hold for procedure      CKD (chronic kidney disease) stage 4, GFR 15-29 ml/min ([facility]) (3/23/2017)    Will hydrate overnight for procedure      CAD (coronary artery disease) (5/2/2017)    No chest pain, continue ASA, BB, statin          Mickey Red PA-C  6/26/2017      [facility] CARDIOLOGY     6/26/2017     7:59 PM    I have personally seen and examined Marlene Box with [facility] [place]. I agree and confirm findings in history, physical exam, and assessment/plan as outlined above with following pertinent additions/exceptions:   Patient presents for scheduled admission for transcatheter aortic valve replacement. He has been admitted the day prior for intravenous hydration given his renal insufficiency and underlying LV dysfunction. He has known severe aortic stenosis which is symptomatic. He is felt to have low gradient low-flow aortic stenosis which was confirmed with dobutamine echocardiogram.  He underwent CT scan and cardiac catheterization as part of his preoperative evaluation. He is felt to be a candidate via the right femoral approach with a 26 mm valve. PE; CV: RRR  L: CTA bilaterally  E: No edema. ASS/Plan:  As above. Admit. Gentle hydration.   Plan TAVR in Travis Romero MD

## 2017-06-26 NOTE — PROGRESS NOTES
CTS: 80year-old man with severe AS (low output-low gradient) peripheral vascular disease, prior CABG, CKD. He is high risk for SAVR (STS calculated by me 13.323%). Patient was discussed at multidisciplinary valve board and TAVR was recommended. All questions answered and he agrees to proceed.

## 2017-06-26 NOTE — IP AVS SNAPSHOT
Current Discharge Medication List  
  
START taking these medications Dose & Instructions Dispensing Information Comments Morning Noon Evening Bedtime  
 furosemide 40 mg tablet Commonly known as:  LASIX Your next dose is:  As needed Dose:  40 mg Take 1 Tab by mouth daily as needed. Quantity:  30 Tab Refills:  2 CONTINUE these medications which have NOT CHANGED Dose & Instructions Dispensing Information Comments Morning Noon Evening Bedtime  
 amiodarone 200 mg tablet Commonly known as:  CORDARONE Your next dose is:  Tomorrow Dose:  200 mg Take 200 mg by mouth daily. Refills:  0  
     
  
   
   
   
  
 aspirin delayed-release 81 mg tablet Your next dose is:  Tomorrow Take  by mouth daily. Refills:  0  
     
  
   
   
   
  
 atorvastatin 20 mg tablet Commonly known as:  LIPITOR Your next dose is: Today Dose:  10 mg Take 10 mg by mouth daily. Refills:  0  
     
   
   
   
  
  
 diphenhydrAMINE 25 mg capsule Commonly known as:  BENADRYL Your next dose is:  As needed Dose:  25 mg Take 25 mg by mouth every six (6) hours as needed. Refills:  0  
     
   
   
   
  
 metoprolol tartrate 25 mg tablet Commonly known as:  LOPRESSOR Your next dose is: Today Dose:  25 mg Take 1 Tab by mouth two (2) times a day. Quantity:  60 Tab Refills:  11  
     
   
   
  
   
  
 rivaroxaban 15 mg Tab tablet Commonly known as:  Terry Jesus Your next dose is:  Tomorrow Dose:  15 mg Take 1 Tab by mouth daily (with breakfast). Quantity:  30 Tab Refills:  11  
     
  
   
   
   
  
 tamsulosin 0.4 mg capsule Commonly known as:  FLOMAX Your next dose is: Today Dose:  0.4 mg Take 0.4 mg by mouth daily. Refills:  0  
     
   
   
  
   
  
 VITAMIN D3 1,000 unit Cap Generic drug:  cholecalciferol Your next dose is:  Tomorrow Take  by mouth daily. Refills:  0 Where to Get Your Medications Information on where to get these meds will be given to you by the nurse or doctor. ! Ask your nurse or doctor about these medications  
  furosemide 40 mg tablet

## 2017-06-27 PROBLEM — I35.0 AORTIC STENOSIS: Status: ACTIVE | Noted: 2017-01-01

## 2017-06-27 NOTE — ANESTHESIA POSTPROCEDURE EVALUATION
Post-Anesthesia Evaluation and Assessment    Patient: Denisha Prasad MRN: [id number]  SSN: [id number]    YOB: 1928  Age: 80 y.o. Sex: male       Cardiovascular Function/Vital Signs  Visit Vitals    BP 99/57    Pulse (!) 56    Temp (P) 37 °C (98.6 °F)    Resp 16    Wt 88.7 kg (195 lb 8.8 oz)    SpO2 98%    BMI 30.63 kg/m2       Patient is status post general anesthesia for Procedure(s):  TRANSCATHETER AORTIC VALVE REPLACEMENT. .    Nausea/Vomiting: None    Postoperative hydration reviewed and adequate. Pain:  Pain Scale 1: Visual (06/27/17 1351)  Pain Intensity 1: 0 (06/27/17 1351)   Managed    Neurological Status:   Neuro  Neurologic State: Alert;Eyes open spontaneously (06/27/17 0700)  Orientation Level: Oriented X4 (06/27/17 0700)  Cognition: Follows commands; Appropriate safety awareness; Appropriate decision making (06/27/17 0700)  Speech: Clear; Appropriate for age (06/27/17 0700)  Assessment L Pupil: Brisk;Round (06/27/17 0700)  Size L Pupil (mm): 2 (06/27/17 1350)  Assessment R Pupil: Brisk;Round (06/27/17 0700)  Size R Pupil (mm): 2 (06/27/17 1350)  LUE Motor Response: Purposeful;Spontaneous  (06/27/17 0700)  LLE Motor Response: Purposeful;Spontaneous  (06/27/17 0700)  RUE Motor Response: Purposeful;Spontaneous  (06/27/17 0700)  RLE Motor Response: Purposeful;Spontaneous  (06/27/17 0700)   At baseline    Mental Status and Level of Consciousness: Arousable    Pulmonary Status:   O2 Device: Nasal cannula (06/27/17 1355)   Adequate oxygenation and airway patent    Complications related to anesthesia: None    Post-anesthesia assessment completed.  No concerns    Signed By: Brenden Lewis MD     June 27, 2017

## 2017-06-27 NOTE — OP NOTES
Operative Report    Patient: Maurilio Alcala MRN: [id number]  SSN: [id number]    YOB: 1928  Age: 80 y.o. Sex: male       Date of Surgery: 6/27/2017     Preoperative Diagnosis: Aortic valve stenosis, unspecified etiology [I35.0]     Postoperative Diagnosis: Aortic valve stenosis, unspecified etiology [I35.0]     Primary Cardiothoracic Surgeon: Huber Amaro MD     Primary Interventional Cardiologist: Annmarie Zhang MD    Anesthesia: General     Procedure: Procedure(s):  TRANSCATHETER AORTIC VALVE REPLACEMENT. Transcatheter aortic valve replacement (26 mm Lopez Ofelia 3 transcatheter aortic valve via right common femoral artery percutaneous approach). Findings:  Successful deployment of a 26 mm Lopez Ofelia 3 transcatheter aortic valve via left common femoral artery percutaneous approach. moderate LV function with mild perivalvular regurgitation. The patient was in normal sinus rhythm at the conclusion of the procedure. Indication for Procedure: The patient is a 80 y.o. male with symptomatic severe aortic stenosis. He was not felt to be a candidate for surgical aortic valve replacement and after discussion at the multidisciplinary valve board transcatheter aortic valve replacement was recommended. After a thorough discussion of all the risks and proposed benefits, the patient agreed to proceed. Procedure in Detail:   Patient premedicated and brought to the operating suite. Time-out performed. Standard monitoring lines placed, and the patient was intubated and placed under general anesthesia without event. Transesophageal echocardiogram was performed, confirming the above valve pathology. Intravenous cefazolin was administered. Patient prepped and draped in standard, meticulous surgical fashion. Ioban drapes were used.     Access to the left common femoral artery and vein was obtained under ultrasound guidance, and 6-Maltese sheaths were placed.  A temporary transvenous right ventricular pacing wire was positioned via the femoral vein. Next, a micropuncture kit was used to gain access to the right common femoral artery and a 6-Lao sheath was placed. This was upsized and the 16-Lao Lopez transcatheter heart valve sheath was placed after deploying Perclose devices in a pre-close fashion.      Next, a balloon valvuloplasty was performed. The patient remained hemodynamically stable. The 26mm Ofelia 3 valve was then prepped and mounted onto the delivery system in the correct orientation. This was then positioned in the ascending aorta, and then advanced across the aortic valve and deployed successfully. The details of this are dictated under a separate cover in Dr. Ebony Cali report. Following deployment, an aortogram and transesophageal echocardiogram were performed, which demonstrated a mild aortic regurgitation and a well-seated, well-functioning prosthetic valve in the aortic position. The patient remained hemodynamically stable and in normal sinus rhythm. At this point, the delivery system and the sheath were removed from the right common femoral artery, and hemostasis was obtained using the Perclose sutures. Sheaths were also removed from the right common femoral artery and vein. At the conclusion of the procedure, the patient was hemodynamically stable and hemostasis was good. The patient was then transported to the CV ICU in critical but stable condition. At the end of the case, all sharp, sponge, and instrument counts were reported as being correct. Estimated Blood Loss:  minimal    Tourniquet Time: * No tourniquets in log *      Implants:   Implant Name Type Inv.  Item Serial No.  Lot No. LRB No. Used Action   VALVE AORTIC SAPEIN 3 26MM -- COMMAND[employer]YS [id number] - [id number]   VALVE AORTIC SAPEIN 3 26MM -- COMMANDER SYS [id number]   N/A 1 Implanted               Specimens: * No specimens in log *        Drains: None Complications: None    Counts: Sponge and needle counts were correct times two.     Signed By:  Jin Presley MD     June 27, 2017

## 2017-06-27 NOTE — PROGRESS NOTES
TRANSFER - IN REPORT:    Verbal report received from Khushboo Forman CRNA on [address]  being received from OR for routine post - op      Report consisted of patients Situation, Background, Assessment and   Recommendations(SBAR). Information from the following report(s) SBAR, Kardex, OR Summary, Procedure Summary, Intake/Output, MAR, Accordion, Recent Results, Med Rec Status and Cardiac Rhythm PACED was reviewed with the receiving nurse. Opportunity for questions and clarification was provided. Assessment completed upon patients arrival to unit and care assumed.

## 2017-06-27 NOTE — PROGRESS NOTES
TRANSFER - OUT REPORT:    Verbal report given to [facility], RN(name) yvan Ordoñez  being transferred to OR(unit) for ordered procedure       Report consisted of patients Situation, Background, Assessment and   Recommendations(SBAR). Information from the following report(s) SBAR, Intake/Output, MAR, Recent Results and Cardiac Rhythm sinus arrythmia was reviewed with the receiving nurse. Lines:   Double Lumen 06/27/17 Right Internal jugular (Active)       Peripheral IV 02/07/17 Right Arm (Active)       Peripheral IV 06/26/17 Left Forearm (Active)   Site Assessment Clean, dry, & intact 6/27/2017  7:00 AM   Phlebitis Assessment 0 6/27/2017  7:00 AM   Infiltration Assessment 0 6/27/2017  7:00 AM   Dressing Status Clean, dry, & intact 6/27/2017  7:00 AM   Dressing Type Transparent;Tape 6/27/2017  7:00 AM   Hub Color/Line Status Pink;Patent; Infusing 6/27/2017  7:00 AM   Action Taken Open ports on tubing capped 6/27/2017  3:00 AM   Alcohol Cap Used No 6/26/2017  4:15 PM       Arterial Line 06/27/17 Right Radial artery (Active)        Opportunity for questions and clarification was provided.       Patient transported with:   Registered Nurse

## 2017-06-27 NOTE — BRIEF OP NOTE
BRIEF OPERATIVE NOTE    Date of Procedure: 6/27/2017   Preoperative Diagnosis: Aortic valve stenosis, unspecified etiology [I35.0]  Postoperative Diagnosis: Aortic valve stenosis, unspecified etiology [I35.0]    Procedure(s):  TRANSCATHETER AORTIC VALVE REPLACEMENT. Surgeon(s) and Role:     * Jj Mueller MD - Primary     * Jad Napier MD - Assisting         Assistant Staff:       Surgical Staff:  Circ-1: Kate Ramos RN  Circ-2: Jenny Fabry, RN  Circ-Relief: Hemalatha Che RN  Perfusionist: Yessi Herring  [place] Tech-1: Vahe Tillman  Scrub RN-1: Gonzalo Drew RN  Radiology Technologist: Celine Cabrera; Kadi Bowers; Rod Blue RN; Jasen Limon; Keya Bolanos  Event Time In   Incision Start 1155   Incision Close 1320     Anesthesia: General   Estimated Blood Loss: minimal  Specimens: * No specimens in log *   Findings: as   Complications: none  Implants:   Implant Name Type Inv.  Item Serial No.  Lot No. LRB No. Used Action   VALVE AORTIC SAPEIN 3 26MM -- COMMANDER SYS [id number] - [id number]   VALVE AORTIC SAPEIN 3 26MM -- COMMANDER SYS [id number] [employer]   N/A 1 Implanted

## 2017-06-27 NOTE — PROGRESS NOTES
Dual skin assessment performed with RN. Pt rolled side-to-side, all skin assessed. Blanchable redness noted to sacrum, Allevyn placed. Scattered ecchymosis to BUE s/p venipuncture. All other skin pink/dry/intact.

## 2017-06-27 NOTE — PROGRESS NOTES
Dr. Chery Stewart at bedside with family, updating on status. Surgery planned for 1100. Opportunity for questions/clarification. Pt/family verbalize understanding and appreciation.

## 2017-06-27 NOTE — PERIOP NOTES
TRANSFER - IN REPORT:    Verbal report received from [facility] on Suyapa Calamity  being received from CVICU for ordered procedure      Report consisted of patients Situation, Background, Assessment and   Recommendations(SBAR). Information from the following report(s) SBAR was reviewed with the receiving nurse. Opportunity for questions and clarification was provided. Assessment completed upon patients arrival to unit and care assumed.

## 2017-06-27 NOTE — PROGRESS NOTES
Patient's BP noted to be 627J systolic and 049'V diastolic. Patient is asymptomatic.   Elena Woodruff NP notified and order received for prn hydralazine IV

## 2017-06-27 NOTE — PERIOP NOTES
TRANSFER - OUT REPORT:    Verbal report given to Yeyo Quinones on [facility]  being transferred to CVICU for routine progression of care       Report consisted of patients Situation, Background, Assessment and   Recommendations(SBAR). Information from the following report(s) SBAR and OR Summary was reviewed with the receiving nurse. Lines:   Peripheral IV 02/07/17 Right Arm (Active)       Peripheral IV 06/26/17 Left Forearm (Active)   Site Assessment Clean, dry, & intact 6/27/2017 11:00 AM   Phlebitis Assessment 0 6/27/2017 11:00 AM   Infiltration Assessment 0 6/27/2017 11:00 AM   Dressing Status Clean, dry, & intact 6/27/2017 11:00 AM   Dressing Type Transparent;Tape 6/27/2017 11:00 AM   Hub Color/Line Status Pink;Patent; Infusing 6/27/2017 11:00 AM   Action Taken Open ports on tubing capped 6/27/2017  3:00 AM   Alcohol Cap Used No 6/26/2017  4:15 PM       Peripheral IV 06/27/17 Right Hand (Active)       Arterial Line 06/27/17 Right Radial artery (Active)   Site Assessment Clean, dry, & intact 6/27/2017 11:00 AM   Dressing Status Clean, dry, & intact 6/27/2017 11:00 AM   Dressing Type Transparent;Tape 6/27/2017 11:00 AM   Line Status Intact and in place 6/27/2017 11:00 AM   Treatment Arm board on;Zeroed or re-zeroed 6/27/2017 11:00 AM   Affected Extremity/Extremities Color distal to insertion site pink (or appropriate for race); Pulses palpable 6/27/2017 11:00 AM        Opportunity for questions and clarification was provided.       Patient transported with:   Monitor

## 2017-06-27 NOTE — PROCEDURES
Kristian Mackey 44       Name:  Te Walden   MR#:  [id number]   :  1928   Account #:  [de-identified]   Date of Adm:  2017       PROCEDURE: Transcatheter aortic valve replacement via the right   femoral approach with a 26-mm Lopez ELAINE 3 valve. INDICATIONS: Severe symptomatic critical aortic stenosis. PRIMARY INTERVENTIONAL CARDIOLOGIST: Carolee Fregoso MD    ASSISTING INTERVENTIONAL CARDIOLOGIST: Prema Strauss. Marilynn Curling, [address] [place] [address]    PRIMARY CARDIOTHORACIC SURGEON: Elijah Carmichael. Paco Dupont MD    PRIMARY IMAGING CARDIOLOGIST: Jp Anne MD    TECHNICAL FACTORS: Patient brought to the operating room and   underwent general anesthesia as directed by Dr. Kathryn Burkett. Following general anesthesia, the patient was prepped and draped in   the usual sterile fashion. Using ultrasound, the left common   femoral vein and left common femoral artery were identified. Under   ultrasound guidance, a 6-Greek arterial sheath was placed in   the artery and vein. The left common and external iliac were   extremely tortuous making catheter manipulation difficult. A   LIMA catheter was advanced and the right common iliac and   femoral system were imaged. Under direct visualization, the right   common femoral artery was entered. Using a micropuncture kit,   this was upgrade to a 6-Greek sheath. Attention was then turned to the left common femoral artery and   vein. A temporary venous pacemaker was advanced via the left common   femoral vein into the RV apex and testing noted appropriate   capture of 0.5 milliamps. Given the tortuosity of the left   common iliacs and femoral system, a 45 cm Destination sheath was   placed in the ascending aorta to help with catheter   manipulation. Through this, a pigtail catheter was placed. Coplanar   angle of the CT scan was suggested to be MAG 19, cranial 14.    Unfortunately, this was very difficult to visualize all 3 cusps. At   this point, a second pigtail was placed via the right common   femoral artery into the noncoronary cusp and the pigtail from   the left common femoral artery was in the right coronary cusp. After multiple aortograms, it was noted the best view was the   initial coplanar angle with Indonesian 19 and the cranial 14. Through the existing 6-Tamazight sheath in the right common femoral   artery, 2 Percloses were deployed in the 2 o'clock and 10   o'clock position. After deployment IV heparin was administered. 14 Andorran dilator was placed and J wire was exchanged for a   Lunderquist wire was placed through the dilator. The 14-Tamazight   Lopez sheath was then placed without difficulty. An AL1   catheter was advanced and using a soft tipped straight wire was   used to cross the aortic valve. This was then exchanged for an   Amplatz extra stiff wire with a preformed curve. A 23-mm Lopez   balloon was then advanced and with rapid pacing, suspended   respirations and appropriate hemodynamic collapse, balloon   valvuloplasty was performed. The 26-mm Lopez valve was then placed in the descending   thoracic aorta. The valve balloon was pulled back inside of the   valve via standard fashion. The valve was then easily advanced   over the aortic arch into position. Once in position, the   position was confirmed with aortic injection. At this point,   respiration was suspended, patient was paced rapidly at 160   beats per minute, with appropriate hemodynamic compromise. The   26-mm Lopez valve was then deployed via standard fashion. Following stent deployment, transesophageal echocardiogram was   performed which showed excellent results with trivial   perivalvular regurgitation. A pigtail was placed in the left   ventricle and simultaneous aortic and left ventricular pressure   shows less than a 4 mm peak to peak gradient and good diastolic   pressures indicating no severe aortic regurgitation.  Based on   this, this was felt acceptable result. Attention was then turned to the right common femoral artery. The   sheath was removed and the Percloses were deployed. There was   excellent hemostasis at the conclusion of the procedure. Patient   was given protamine intravenously by anesthesia. Attention was then turned to the left common femoral artery. The 6-  Slovenian sheath was removed and a Mynx vascular closure device was   deployed with good hemostasis. Pacemaker was left in place as the patient developed a new right   bundle branch block. He will be monitored in the post procedural   setting. CONCLUSIONS: Successful transcatheter aortic valve replacement   using a 26-mm Lopez ELAINE 3 valve via the right femoral   approach.          MD LUCERO Burdick / Zackery Dickinson   D:  06/27/2017   18:15   T:  06/27/2017   18:40   Job #:  [id number]

## 2017-06-28 PROBLEM — I50.21 SYSTOLIC CHF, ACUTE (HCC): Status: ACTIVE | Noted: 2017-01-01

## 2017-06-28 PROBLEM — Z95.2 S/P TAVR (TRANSCATHETER AORTIC VALVE REPLACEMENT): Status: ACTIVE | Noted: 2017-01-01

## 2017-06-28 PROBLEM — I35.0 AORTIC STENOSIS: Status: RESOLVED | Noted: 2017-01-01 | Resolved: 2017-01-01

## 2017-06-28 NOTE — PROGRESS NOTES
TRANSFER - OUT REPORT:    Verbal report given to Oj Arana RN(name) on Leyla Tate  being transferred to [place] [address](unit) for routine progression of care       Report consisted of patients Situation, Background, Assessment and   Recommendations(SBAR). Information from the following report(s) SBAR, Kardex, OR Summary, Procedure Summary, Intake/Output, MAR, Recent Results, Med Rec Status and Cardiac Rhythm SR, SA, PVCs, PACs. was reviewed with the receiving nurse. Lines:   Peripheral IV 02/07/17 Right Arm (Active)       Peripheral IV 06/27/17 Right Hand (Active)   Site Assessment Clean, dry, & intact 6/28/2017  3:00 PM   Phlebitis Assessment 0 6/28/2017  3:00 PM   Infiltration Assessment 0 6/28/2017  3:00 PM   Dressing Status Clean, dry, & intact 6/28/2017  3:00 PM   Dressing Type Transparent 6/28/2017  3:00 PM   Hub Color/Line Status Flushed;Patent 6/28/2017  3:00 PM        Opportunity for questions and clarification was provided.       Patient transported with:   Registered Nurse

## 2017-06-28 NOTE — PROGRESS NOTES
[facility] CARDIOLOGY PROGRESS NOTE           6/28/2017 11:45 AM    Admit Date: 6/26/2017      Subjective:   Patient with new LBBB. In sinus with frequent and consecutive PACs. No AV block or bradycardia. Has not used pacemaker overnight. ROS:  Cardiovascular:  As noted above    Objective:      Vitals:    06/28/17 0801 06/28/17 0901 06/28/17 1000 06/28/17 1101   BP: 128/78 125/74 140/77 119/80   Pulse: 68 64 78 80   Resp: 18 23 27 20   Temp:       SpO2: 99% 97% 100% 92%   Weight:           Physical Exam:  General-No Acute Distress  Neck- supple, mild JVD  CV- regular rate and rhythm with Grade !/VI RYAN. Lung- clear bilaterally  Abd- soft, nontender, nondistended  Ext- Trivial edema bilaterally. Skin- warm and dry      Data Review:   Recent Labs      06/28/17   0400  06/27/17   0330  06/26/17   1749   NA  143  145  142   K  4.1  4.5  4.7   MG   --    --   2.4   BUN  35*  40*  43*   CREA  2.64*  2.61*  2.64*   GLU  99  89  91   WBC  6.7  5.9  4.6   HGB  11.3*  12.1*  12.5*   HCT  34.3*  36.3*  37.1*   PLT  160  184  212   INR   --    --   1.1      No results found for: EUNICE Brothers    Assessment/Plan:     Principal Problem:    S/P TAVR (transcatheter aortic valve replacement) (6/28/2017)    POD #1 S/P TAVR with 26 mm valve. New LBBB post implantation. Will remove temporary pacemaker. Restart Xarelto. Continue ASA. Active Problems:    Nonrheumatic aortic valve stenosis (8/24/2015)    S/P TAVR      Dyslipidemia (3/16/2016)    Continue Lipitor. Atrial fibrillation ([address] (1/18/2017)    Continue amiodarone. Stop lopressor with new LBBB. Will follow on telemetry. CKD (chronic kidney disease) stage 4, GFR 15-29 ml/min ([facility]) (3/23/2017)    Stable post cath. CAD (coronary artery disease) (5/2/2017)    No angina    Systolic CHF, acute  Improved after lasix IV yesterday. Assess volume status daily with PRN lasix.                      Radha Melo, MD  6/28/2017 11:45 AM

## 2017-06-28 NOTE — PROGRESS NOTES
Bedside shift change report given to Monica Hernandez RN (oncoming nurse) by Mariia Leroy RN (offgoing nurse). Report included the following information SBAR, Kardex, Procedure Summary, Intake/Output, MAR, Recent Results, Med Rec Status and Cardiac Rhythm of Sinus Arrhythmia with BBB.

## 2017-06-29 NOTE — PROGRESS NOTES
Prescriptions and discharge instructions provided and explained to patient and spouse, both verbalized understanding and agreement. Patient escorted to discharge area via wheelchair by hospital staff.

## 2017-06-29 NOTE — PROGRESS NOTES
Bedside and Verbal shift change report given to Yulia Orozoc (oncoming nurse) by Yulia Orozco (offgoing nurse). Report included the following information Kardex, OR Summary, Intake/Output, MAR and Recent Results.

## 2017-06-29 NOTE — PROGRESS NOTES
[facility] CARDIOLOGY PROGRESS NOTE           6/29/2017 11:45 AM    Admit Date: 6/26/2017      Subjective:   Patient with new LBBB. In sinus with frequent and consecutive PACs. No AV block or bradycardia. HAd episode of afib with tachycardia last night. Lopressor resumed. Echo yesterday with normal valve function and improved EF. Renal function stable. Back on Xarelto. He feels well. ROS:  Cardiovascular:  As noted above    Objective:      Vitals:    06/28/17 1932 06/28/17 2116 06/28/17 2200 06/29/17 0200   BP: 133/77  (!) 153/97 123/65   Pulse: 99  99 78   Resp: 22 18 18   Temp: 99 °F (37.2 °C)  99.5 °F (37.5 °C) 98.8 °F (37.1 °C)   SpO2: 94%  92% 92%   Weight:  90.2 kg (198 lb 13.7 oz)  90.2 kg (198 lb 13.7 oz)       Physical Exam:  General-No Acute Distress  Neck- supple, mild JVD  CV- regular rate and rhythm with Grade I/VI RYAN. Lung- clear bilaterally  Abd- soft, nontender, nondistended  Ext- Trivial edema bilaterally. Skin- warm and dry      Data Review:   Recent Labs      06/29/17   0350  06/28/17   0400   06/26/17   1749   NA  143  143   < >  142   K  3.9  4.1   < >  4.7   MG   --    --    --   2.4   BUN  37*  35*   < >  43*   CREA  2.83*  2.64*   < >  2.64*   GLU  110*  99   < >  91   WBC  5.9  6.7   < >  4.6   HGB  10.9*  11.3*   < >  12.5*   HCT  32.0*  34.3*   < >  37.1*   PLT  139*  160   < >  212   INR   --    --    --   1.1    < > = values in this interval not displayed. No results found for: EUNICE Mcclain    Assessment/Plan:     Principal Problem:    S/P TAVR (transcatheter aortic valve replacement) (6/28/2017)    POD #1 S/P TAVR with 26 mm valve. New LBBB post implantation. Will remove temporary pacemaker. Continue ASA and Xarelto    Active Problems:    Nonrheumatic aortic valve stenosis (8/24/2015)    S/P TAVR      Dyslipidemia (3/16/2016)    Continue Lipitor. Atrial fibrillation ([facility] [address] (1/18/2017)    Continue amiodarone and lopressor. CKD (chronic kidney disease) stage 4, GFR 15-29 ml/min ([facility]) (3/23/2017)    Stable post cath. CAD (coronary artery disease) (5/2/2017)    No angina    Systolic CHF, acute  Improved after lasix IV. EF better post TAVR. Give lasix 40 at discharge to take PRN edema. Disp  Home today. BMP next week and see me on Friday.              Gilbert Donato MD  6/29/2017 11:45 AM

## 2017-06-29 NOTE — PROGRESS NOTES
Call placed to Dr. Jessica Hartmann. Carotid ultrasound ordered before TAVR were not able to be done, clarified he still wants them done before dischare. Will have discharge ready to leave once tests are complete.

## 2017-06-29 NOTE — PROGRESS NOTES
Spoke with spouse regarding appts for TAVR followup and DC instructons for the pt. Verbalized understanding and contact information provided.

## 2017-06-29 NOTE — DISCHARGE SUMMARY
[facility]iology Discharge Summary     Patient ID:  Yoly Tesfaye  [id number]  89 y.o.  2/14/1928    Admit date: 6/26/2017    Discharge date:  6/29/2017    Admitting Physician: Dk Henry MD     Discharge Physician: ARNULFO Bolanos/Dr. Eyl Vizcaino    Admission Diagnoses: Aortic valve stenosis, unspecified etiology [I35.0]    Discharge Diagnoses:   Patient Active Problem List    Diagnosis Date Noted    S/P TAVR (transcatheter aortic valve replacement) 18/95/8119    Systolic CHF, acute ([facility] [address] 06/28/2017    Peripheral vascular disease ([facility] [address]     Renal artery stenosis ([facility]     CAD (coronary artery disease) 05/02/2017    Near syncope 04/11/2017    CKD (chronic kidney disease) stage 4, GFR 15-29 ml/min ([facility]) 03/23/2017    Atrial fibrillation ([facility] [address] 01/18/2017    Malaise/fatigue/weakness/tiredness 08/05/2016    GERD (gastroesophageal reflux disease) 08/05/2016    Tachycardia 08/05/2016    HLD (hyperlipidemia) other unsp Dyslipidemia  08/05/2016    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    Brachial artery occlusion, upper extremity ([facility] [address] 08/05/2016    S/P CABG  08/05/2016    HTN - controlled, benign 08/05/2016    Dyslipidemia 03/16/2016    Nonrheumatic aortic valve stenosis 08/24/2015    Shortness of breath 08/24/2015    Esophageal reflux 08/24/2015    Anxiety state, unspecified 08/24/2015    Mitral valve stenosis and aortic valve stenosis 08/24/2015    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 Ely Vizcaino in follow up for severe aortic stenosis. He was seen by CTS and felt to be high risk for surgical AVR.   The patient was felt to be an appropriate candidate for TAVR. He was admitted for planned TAVR. The patient underwent successful balloon valvuloplasty and transcatheter aortic valve replacement with a 26mm Lopez Ofelia 3 valve. The patient was monitored closely in the CVICU. He had a new LBBB post procedure but did not have any AV block or bradycardia. He was diuresed with IV lasix. [facility] Echocardiogram showed normal function of bioprosthesis. He felt well. He had an episode of a-fib with RVR overnight on 27th. Lopressor was resumed. The morning of 6/29/17, patient was up feeling well without any complaints of chest pain or shortness of breath. Patient's labs were stable. Patient was seen and examined by Dr. Tram Reynolds and determined stable and ready for discharge. Patient was instructed on the importance of medication compliance. He is discharged on ASA and Xarelto. He will continue lasix as needed. The patient will have transitional care follow up with [facility] Dr. Tram Reynolds on July 7th at 12:00pm [facility]. He will have repeat BMP next week. A repeat echocardiogram will be obtained in 1 month. DISPOSITION: The patient is being discharged home in stable condition on a low saturated fat, low cholesterol and low salt diet. The patient is instructed to advance activities as tolerated to the limit of fatigue or shortness of breath. The patient is instructed to avoid lifting anything heavier than 10 lbs for 2 weeks. The patient is instructed to avoid any straining, stooping or squatting for 2 weeks. The patient is instructed not to drive for 1 week. The patient is instructed to watch the groin site for bleeding/oozing; if seen, the patient is instructed to apply firm pressure with a clean cloth and call [facility] at [phone].  The patient is instructed to watch for signs of infection which include: increasing area of redness, fever/hot to touch or purulent drainage at the groin site. The patient is instructed not to soak in a bathtub for 7-10 days, but is cleared to shower. The patient is instructed to return to the ER immediately for any severe pain, color change, or temperature change in leg. The patient is informed that prophylaxis for bacterial endocarditis is recommended for high-risk procedures such as all dental procedures that involve manipulation of gingival tissue, the periapical region of teeth, or perforation of the oral mucosa. Discharge Exam:   Visit Vitals    /86 (BP 1 Location: Left arm, BP Patient Position: At rest)    Pulse 68    Temp 98.1 °F (36.7 °C)    Resp 16    Wt 90.2 kg (198 lb 13.7 oz)    SpO2 93%    BMI 31.15 kg/m2     Patient has been seen by Dr. Basilio Fothergill: see his progress note for exam details.     Recent Results (from the past 24 hour(s))   EKG, 12 LEAD, SUBSEQUENT    Collection Time: 06/28/17  9:34 AM   Result Value Ref Range    Ventricular Rate 75 BPM    Atrial Rate 78 BPM    QRS Duration 158 ms    Q-T Interval 516 ms    QTC Calculation (Bezet) 576 ms    Calculated R Axis 3 degrees    Calculated T Axis -114 degrees    Diagnosis       Sinus rhythm with frequent and consecutive Premature atrial complexes  Left bundle branch block  Abnormal ECG  When compared with ECG of 26-JUN-2017 15:33,  Left bundle branch block has replaced Non-specific intra-ventricular   conduction block  Confirmed by ST EVANGELINA KAUR MD (), ONUR BARRETO ([id number]) on 6/28/2017 6:69:13 PM     METABOLIC PANEL, BASIC    Collection Time: 06/29/17  3:50 AM   Result Value Ref Range    Sodium 143 136 - 145 mmol/L    Potassium 3.9 3.5 - 5.1 mmol/L    Chloride 107 98 - 107 mmol/L    CO2 25 21 - 32 mmol/L    Anion gap 11 7 - 16 mmol/L    Glucose 110 (H) 65 - 100 mg/dL    BUN 37 (H) 8 - 23 MG/DL    Creatinine 2.83 (H) 0.8 - 1.5 MG/DL    GFR est AA 27 (L) >60 ml/min/1.73m2    GFR est non-AA 23 (L) >60 ml/min/1.73m2    Calcium 8.0 (L) 8.3 - 10.4 MG/DL   CBC W/O DIFF    Collection Time: 06/29/17  3:50 AM   Result Value Ref Range    WBC 5.9 4.3 - 11.1 K/uL    RBC 3.34 (L) 4.23 - 5.67 M/uL    HGB 10.9 (L) 13.6 - 17.2 g/dL    HCT 32.0 (L) 41.1 - 50.3 %    MCV 95.8 79.6 - 97.8 FL    MCH 32.6 26.1 - 32.9 PG    MCHC 34.1 31.4 - 35.0 g/dL    RDW 15.4 (H) 11.9 - 14.6 %    PLATELET 876 (L) 114 - 450 K/uL    MPV 9.7 (L) 10.8 - 14.1 FL         Patient Instructions:   Current Discharge Medication List      START taking these medications    Details   furosemide (LASIX) 40 mg tablet Take 1 Tab by mouth daily as needed. Qty: 30 Tab, Refills: 2         CONTINUE these medications which have NOT CHANGED    Details   amiodarone (CORDARONE) 200 mg tablet Take 200 mg by mouth daily. rivaroxaban (XARELTO) 15 mg tab tablet Take 1 Tab by mouth daily (with breakfast). Qty: 30 Tab, Refills: 11      metoprolol tartrate (LOPRESSOR) 25 mg tablet Take 1 Tab by mouth two (2) times a day. Qty: 60 Tab, Refills: 11      aspirin delayed-release 81 mg tablet Take  by mouth daily. tamsulosin (FLOMAX) 0.4 mg capsule Take 0.4 mg by mouth daily. cholecalciferol (VITAMIN D3) 1,000 unit cap Take  by mouth daily. diphenhydrAMINE (BENADRYL) 25 mg capsule Take 25 mg by mouth every six (6) hours as needed. atorvastatin (LIPITOR) 20 mg tablet Take 10 mg by mouth daily.              Signed:  Jim Matos PA-C  6/29/2017  8:44 AM

## 2017-06-29 NOTE — PROGRESS NOTES
Cardiac rehab: chart reviewed, appropriate for outpatient cardiac rehab. Patient qualifies for cardiac rehab with: TAVR. Discussion of benefits of outpatient cardiac rehab provided and time allowed for questions. Discussed exercise, diet, and lifestyle changes to improve heart health. Pt states that he does not think he will be interested in participating because he does enough at home; however, he and his wife indicated they might stop by to tour our facility after his follow-up appointment at [facility] next week.

## 2017-06-29 NOTE — DISCHARGE INSTRUCTIONS
High Blood Pressure: Care Instructions  Your Care Instructions  If your blood pressure is usually above 140/90, you have high blood pressure, or hypertension. That means the top number is 140 or higher or the bottom number is 90 or higher, or both. Despite what a lot of people think, high blood pressure usually doesn't cause headaches or make you feel dizzy or lightheaded. It usually has no symptoms. But it does increase your risk for heart attack, stroke, and kidney or eye damage. The higher your blood pressure, the more your risk increases. Your doctor will give you a goal for your blood pressure. Your goal will be based on your health and your age. An example of a goal is to keep your blood pressure below 140/90. Lifestyle changes, such as eating healthy and being active, are always important to help lower blood pressure. You might also take medicine to reach your blood pressure goal.  Follow-up care is a key part of your treatment and safety. Be sure to make and go to all appointments, and call your doctor if you are having problems. It's also a good idea to know your test results and keep a list of the medicines you take. How can you care for yourself at home? Medical treatment  · If you stop taking your medicine, your blood pressure will go back up. You may take one or more types of medicine to lower your blood pressure. Be safe with medicines. Take your medicine exactly as prescribed. Call your doctor if you think you are having a problem with your medicine. · Talk to your doctor before you start taking aspirin every day. Aspirin can help certain people lower their risk of a heart attack or stroke. But taking aspirin isn't right for everyone, because it can cause serious bleeding. · See your doctor regularly. You may need to see the doctor more often at first or until your blood pressure comes down.   · If you are taking blood pressure medicine, talk to your doctor before you take decongestants or anti-inflammatory medicine, such as ibuprofen. Some of these medicines can raise blood pressure. · Learn how to check your blood pressure at home. Lifestyle changes  · Stay at a healthy weight. This is especially important if you put on weight around the waist. Losing even 10 pounds can help you lower your blood pressure. · If your doctor recommends it, get more exercise. Walking is a good choice. Bit by bit, increase the amount you walk every day. Try for at least 30 minutes on most days of the week. You also may want to swim, bike, or do other activities. · Avoid or limit alcohol. Talk to your doctor about whether you can drink any alcohol. · Try to limit how much sodium you eat to less than 2,300 milligrams (mg) a day. Your doctor may ask you to try to eat less than 1,500 mg a day. · Eat plenty of fruits (such as bananas and oranges), vegetables, legumes, whole grains, and low-fat dairy products. · Lower the amount of saturated fat in your diet. Saturated fat is found in animal products such as milk, cheese, and meat. Limiting these foods may help you lose weight and also lower your risk for heart disease. · Do not smoke. Smoking increases your risk for heart attack and stroke. If you need help quitting, talk to your doctor about stop-smoking programs and medicines. These can increase your chances of quitting for good. When should you call for help? Call 911 anytime you think you may need emergency care. This may mean having symptoms that suggest that your blood pressure is causing a serious heart or blood vessel problem. Your blood pressure may be over 180/110. For example, call 911 if:  · You have symptoms of a heart attack. These may include:  ¨ Chest pain or pressure, or a strange feeling in the chest.  ¨ Sweating. ¨ Shortness of breath. ¨ Nausea or vomiting. ¨ Pain, pressure, or a strange feeling in the back, neck, jaw, or upper belly or in one or both shoulders or arms.   ¨ Lightheadedness or sudden weakness. ¨ A fast or irregular heartbeat. · You have symptoms of a stroke. These may include:  ¨ Sudden numbness, tingling, weakness, or loss of movement in your face, arm, or leg, especially on only one side of your body. ¨ Sudden vision changes. ¨ Sudden trouble speaking. ¨ Sudden confusion or trouble understanding simple statements. ¨ Sudden problems with walking or balance. ¨ A sudden, severe headache that is different from past headaches. · You have severe back or belly pain. Do not wait until your blood pressure comes down on its own. Get help right away. Call your doctor now or seek immediate care if:  · Your blood pressure is much higher than normal (such as 180/110 or higher), but you don't have symptoms. · You think high blood pressure is causing symptoms, such as:  ¨ Severe headache. ¨ Blurry vision. Watch closely for changes in your health, and be sure to contact your doctor if:  · Your blood pressure measures 140/90 or higher at least 2 times. That means the top number is 140 or higher or the bottom number is 90 or higher, or both. · You think you may be having side effects from your blood pressure medicine. · Your blood pressure is usually normal, but it goes above normal at least 2 times. Where can you learn more? Go to http://leslie-get.info/. Enter M564 in the search box to learn more about \"High Blood Pressure: Care Instructions. \"  Current as of: August 8, 2016  Content Version: 11.3  © 8151-5705 [employer]. Care instructions adapted under license by [employer] (which disclaims liability or warranty for this information). If you have questions about a medical condition or this instruction, always ask your healthcare professional. [place] [address] any warranty or liability for your use of this information.        Atrial Fibrillation: Care Instructions  Your Care Instructions    Atrial fibrillation is an irregular and often fast heartbeat. Treating this condition is important for several reasons. It can cause blood clots, which can travel from your heart to your brain and cause a stroke. If you have a fast heartbeat, you may feel lightheaded, dizzy, and weak. An irregular heartbeat can also increase your risk for heart failure. Atrial fibrillation is often the result of another heart condition, such as high blood pressure or coronary artery disease. Making changes to improve your heart condition will help you stay healthy and active. Follow-up care is a key part of your treatment and safety. Be sure to make and go to all appointments, and call your doctor if you are having problems. It's also a good idea to know your test results and keep a list of the medicines you take. How can you care for yourself at home? Medicines  · Take your medicines exactly as prescribed. Call your doctor if you think you are having a problem with your medicine. You will get more details on the specific medicines your doctor prescribes. · If your doctor has given you a blood thinner to prevent a stroke, be sure you get instructions about how to take your medicine safely. Blood thinners can cause serious bleeding problems. · Do not take any vitamins, over-the-counter drugs, or herbal products without talking to your doctor first.  Lifestyle changes  · Do not smoke. Smoking can increase your chance of a stroke and heart attack. If you need help quitting, talk to your doctor about stop-smoking programs and medicines. These can increase your chances of quitting for good. · Eat a heart-healthy diet. · Stay at a healthy weight. Lose weight if you need to. · Limit alcohol to 2 drinks a day for men and 1 drink a day for women. Too much alcohol can cause health problems. · Avoid colds and flu. Get a pneumococcal vaccine shot. If you have had one before, ask your doctor whether you need another dose. Get a flu shot every year.  If you must be around people with colds or flu, wash your hands often. Activity  · If your doctor recommends it, get more exercise. Walking is a good choice. Bit by bit, increase the amount you walk every day. Try for at least 30 minutes on most days of the week. You also may want to swim, bike, or do other activities. Your doctor may suggest that you join a cardiac rehabilitation program so that you can have help increasing your physical activity safely. · Start light exercise if your doctor says it is okay. Even a small amount will help you get stronger, have more energy, and manage stress. Walking is an easy way to get exercise. Start out by walking a little more than you did in the hospital. Gradually increase the amount you walk. · When you exercise, watch for signs that your heart is working too hard. You are pushing too hard if you cannot talk while you are exercising. If you become short of breath or dizzy or have chest pain, sit down and rest immediately. · Check your pulse regularly. Place two fingers on the artery at the palm side of your wrist, in line with your thumb. If your heartbeat seems uneven or fast, talk to your doctor. When should you call for help? Call 911 anytime you think you may need emergency care. For example, call if:  · You have symptoms of a heart attack. These may include:  ¨ Chest pain or pressure, or a strange feeling in the chest.  ¨ Sweating. ¨ Shortness of breath. ¨ Nausea or vomiting. ¨ Pain, pressure, or a strange feeling in the back, neck, jaw, or upper belly or in one or both shoulders or arms. ¨ Lightheadedness or sudden weakness. ¨ A fast or irregular heartbeat. After you call 911, the  may tell you to chew 1 adult-strength or 2 to 4 low-dose aspirin. Wait for an ambulance. Do not try to drive yourself. · You have symptoms of a stroke.  These may include:  ¨ Sudden numbness, tingling, weakness, or loss of movement in your face, arm, or leg, especially on only one side of your body. ¨ Sudden vision changes. ¨ Sudden trouble speaking. ¨ Sudden confusion or trouble understanding simple statements. ¨ Sudden problems with walking or balance. ¨ A sudden, severe headache that is different from past headaches. · You passed out (lost consciousness). Call your doctor now or seek immediate medical care if:  · You have new or increased shortness of breath. · You feel dizzy or lightheaded, or you feel like you may faint. · Your heart rate becomes irregular. · You can feel your heart flutter in your chest or skip heartbeats. Tell your doctor if these symptoms are new or worse. Watch closely for changes in your health, and be sure to contact your doctor if you have any problems. Where can you learn more? Go to http://leslie-get.info/. Enter U020 in the search box to learn more about \"Atrial Fibrillation: Care Instructions. \"  Current as of: September 21, 2016  Content Version: 11.3  © 0092-6944 [employer]. Care instructions adapted under license by [employer] (which disclaims liability or warranty for this information). If you have questions about a medical condition or this instruction, always ask your healthcare professional. [place] [address] any warranty or liability for your use of this information. Rivaroxaban (By mouth)   Rivaroxaban (iwy-a-SEI-a-ban)  Treats and prevents blood clots, which lowers the risk of stroke, deep vein thrombosis (DVT), pulmonary embolism (PE), and similar conditions. This medicine is a blood thinner. Brand Name(s): Xarelto, Xarelto Starter Pack   There may be other brand names for this medicine. When This Medicine Should Not Be Used: This medicine is not right for everyone. Do not use it if you had an allergic reaction to rivaroxaban, or you have severe bleeding.   How to Use This Medicine:   Tablet  · Take this medicine as directed, and take it at the same time each day.  · 10-milligram (mg) tablet: Take with or without food. · 15-mg or 20-mg tablet: Take with food. · If you cannot swallow the tablets, you may crush the tablet and mix it with applesauce. Eat some food after you swallow the mixture. · Tube feeding: You may crush the tablet and mix the medicine in 50 milliliters (mL) of water before giving it via the tube. This must be followed by a feeding. · This medicine should come with a Medication Guide. Ask your pharmacist for a copy if you do not have one. · Missed dose:   ¨ Ask your doctor or pharmacist if you are not sure what to do if you miss a dose. ¨ Once-daily dose: If you miss a dose or forget to use your medicine, use it as soon as you can on the same day. Do not use extra medicine to make up for a missed dose. ¨ Twice-daily dose to treat a blood clot (15-mg tablet): If you miss a dose or forget to use your medicine, use it as soon as you can on the same day. You may take 2 doses at the same time to make up for the missed dose. This is only for people who take a total of 30 mg per day. · Store the medicine in a closed container at room temperature, away from heat, moisture, and direct light. Drugs and Foods to Avoid:   Ask your doctor or pharmacist before using any other medicine, including over-the-counter medicines, vitamins, and herbal products. · Some foods and medicines can affect how rivaroxaban works. Tell your doctor if you are using any of the following:  ¨ NSAID medicine (including aspirin, celecoxib, diclofenac, ibuprofen, naproxen)  ¨ Ketoconazole, itraconazole, lopinavir, ritonavir, indinavir, conivaptan, carbamazepine, phenytoin, rifampin, Radha's wort  ¨ Another blood thinner (including clopidogrel, enoxaparin, heparin, warfarin)  Warnings While Using This Medicine:   · Tell your doctor if you are pregnant or breastfeeding, or if you have kidney disease, liver disease, bleeding problems, or an artificial heart valve.   · This medicine may increase your risk of bleeding. Be careful to avoid injuries that could cause bleeding. Stay away from rough sports or other situations where you could be bruised, cut, or hurt. Brush and floss your teeth gently. Be careful when using sharp objects, including razors and fingernail clippers. Avoid picking your nose. If you need to blow your nose, blow it gently. · This medicine may cause nerve damage if you have a medical procedure done to your back, including anesthesia or a spinal puncture. This is more likely to happen if you have a history of back injury, back surgery, problems with your spine, or procedures or punctures to your back. Tell your doctor if you are also taking another blood thinner, because this also increases the risk. · Do not stop using this medicine suddenly without asking your doctor. You might have a higher risk of stroke for a short time after you stop using this medicine. · Tell any doctor or dentist who treats you that you are using this medicine. · Your doctor will do lab tests at regular visits to check on the effects of this medicine. Keep all appointments. · Keep all medicine out of the reach of children. Never share your medicine with anyone. Possible Side Effects While Using This Medicine:   Call your doctor right away if you notice any of these side effects:  · Allergic reaction: Itching or hives, swelling in your face or hands, swelling or tingling in your mouth or throat, chest tightness, trouble breathing  · Blistering, peeling, or red skin rash  · Decrease in how much or how often you urinate  · Heavy menstrual bleeding, or vaginal bleeding  · Red or brown urine, bloody or black, tarry stools  · Unusual bleeding or bruising, including frequent nosebleeds  · Vomiting blood or material that looks like coffee grounds  If you notice other side effects that you think are caused by this medicine, tell your doctor. Call your doctor for medical advice about side effects.  You may report side effects to FDA at 9-037-FDA-0268  © 2017 [address] Information is for End User's use only and may not be sold, redistributed or otherwise used for commercial purposes. The above information is an  only. It is not intended as medical advice for individual conditions or treatments. Talk to your doctor, nurse or pharmacist before following any medical regimen to see if it is safe and effective for you. Heart-Healthy Diet: Care Instructions  Your Care Instructions    A heart-healthy diet has lots of vegetables, fruits, nuts, beans, and whole grains, and is low in salt. It limits foods that are high in saturated fat, such as meats, cheeses, and fried foods. It may be hard to change your diet, but even small changes can lower your risk of heart attack and heart disease. Follow-up care is a key part of your treatment and safety. Be sure to make and go to all appointments, and call your doctor if you are having problems. It's also a good idea to know your test results and keep a list of the medicines you take. How can you care for yourself at home? Watch your portions  · Learn what a serving is. A \"serving\" and a \"portion\" are not always the same thing. Make sure that you are not eating larger portions than are recommended. For example, a serving of pasta is ½ cup. A serving size of meat is 2 to 3 ounces. A 3-ounce serving is about the size of a deck of cards. Measure serving sizes until you are good at [place]" them. Keep in mind that restaurants often serve portions that are 2 or 3 times the size of one serving. · To keep your energy level up and keep you from feeling hungry, eat often but in smaller portions. · Eat only the number of calories you need to stay at a healthy weight. If you need to lose weight, eat fewer calories than your body burns (through exercise and other physical activity).   Eat more fruits and vegetables  · Eat a variety of fruit and vegetables every day. Dark green, deep orange, red, or yellow fruits and vegetables are especially good for you. Examples include spinach, carrots, peaches, and berries. · Keep carrots, celery, and other veggies handy for snacks. Buy fruit that is in season and store it where you can see it so that you will be tempted to eat it. · Cook dishes that have a lot of veggies in them, such as stir-fries and soups. Limit saturated and trans fat  · Read food labels, and try to avoid saturated and trans fats. They increase your risk of heart disease. Trans fat is found in many processed foods such as cookies and crackers. · Use olive or canola oil when you cook. Try cholesterol-lowering spreads, such as Benecol or Take Control. · Bake, broil, grill, or steam foods instead of frying them. · Choose lean meats instead of high-fat meats such as hot dogs and sausages. Cut off all visible fat when you prepare meat. · Eat fish, skinless poultry, and meat alternatives such as soy products instead of high-fat meats. Soy products, such as tofu, may be especially good for your heart. · Choose low-fat or fat-free milk and dairy products. Eat fish  · Eat at least two servings of fish a week. Certain fish, such as salmon and tuna, contain omega-3 fatty acids, which may help reduce your risk of heart attack. Eat foods high in fiber  · Eat a variety of grain products every day. Include whole-grain foods that have lots of fiber and nutrients. Examples of whole-grain foods include oats, whole wheat bread, and brown rice. · Buy whole-grain breads and cereals, instead of white bread or pastries. Limit salt and sodium  · Limit how much salt and sodium you eat to help lower your blood pressure. · Taste food before you salt it. Add only a little salt when you think you need it. With time, your taste buds will adjust to less salt. · Eat fewer snack items, fast foods, and other high-salt, processed foods.  Check food labels for the amount of sodium in packaged foods. · Choose low-sodium versions of canned goods (such as soups, vegetables, and beans). Limit sugar  · Limit drinks and foods with added sugar. These include candy, desserts, and soda pop. Limit alcohol  · Limit alcohol to no more than 2 drinks a day for men and 1 drink a day for women. Too much alcohol can cause health problems. When should you call for help? Watch closely for changes in your health, and be sure to contact your doctor if:  · You would like help planning heart-healthy meals. Where can you learn more? Go to http://leslieOneTokget.info/. Enter V137 in the search box to learn more about \"Heart-Healthy Diet: Care Instructions. \"  Current as of: April 3, 2017  Content Version: 11.3  © 5378-5041 [employer]. Care instructions adapted under license by [employer] (which disclaims liability or warranty for this information). If you have questions about a medical condition or this instruction, always ask your healthcare professional. [place] [address] any warranty or liability for your use of this information. Learning About Transcatheter Aortic Valve Replacement (TAVR)  What is TAVR? Transcatheter aortic valve replacement (TAVR) is a procedure that replaces the aortic heart valve. It is done to treat aortic valve stenosis. In aortic valve stenosis, the valve between your heart and the large blood vessel that carries blood to the body (aorta) has narrowed. That forces the heart to pump harder to get enough blood through the valve. In TAVR, the doctor uses a catheter to put in the new heart valve. Open-heart surgery is not done. TAVR is a newer procedure. How well it works long-term is not known yet. And TAVR can cause serious problems. These include stroke, a heart attack during the procedure, or even death.   TAVR may be a good option for a person who cannot have surgery or for a person who has a high risk of serious problems from open-heart surgery. For example, you might think about TAVR if you are not healthy enough for an open-heart surgery. TAVR may not be a good choice if an open-heart surgery is likely to be successful. A team of doctors will use professional guidelines to decide whether or not TAVR is a good choice for you. How is TAVR done? TAVR is often done through an incision (cut) in the groin. But sometimes a small cut is made in the chest. The doctor uses a tube called a catheter and special tools that fit inside the catheter. The doctor puts the catheter into a blood vessel and moves it through the blood vessel and into the heart. A specially designed artificial valve fits inside the catheter. The doctor then moves the new valve into the damaged aortic valve. The artificial valve expands and takes the place of the damaged aortic valve. Most people will be asleep for the procedure. The surgery usually takes about 2 to 3 hours. You will have to stay in the hospital for several days after the procedure. What can you expect after TAVR? · While you are in the hospital, your doctors and nurses will monitor you to check how the new valve is working. · You will receive information from the hospital about diet, activities, and medicine. · You will need to have regular checkups with your doctor. · When you leave the hospital, your doctor may give you a blood thinner for a few months to prevent blood clots. If you get a blood thinner, be sure you get instructions about how to take your medicine safely. Blood thinners can cause serious bleeding problems. Follow-up care is a key part of your treatment and safety. Be sure to make and go to all appointments, and call your doctor if you are having problems. It's also a good idea to know your test results and keep a list of the medicines you take. Where can you learn more? Go to http://leslie-get.info/.   Enter Q551 in the search box to learn more about \"Learning About Transcatheter Aortic Valve Replacement (TAVR). \"  Current as of: April 3, 2017  Content Version: 11.3  © 6720-7786 [email or web address]. Care instructions adapted under license by [employer] (which disclaims liability or warranty for this information). If you have questions about a medical condition or this instruction, always ask your healthcare professional. [address] any warranty or liability for your use of this information. Do not lift, push or pull anything heavier than 10 lbs for the next 2 weeks. You should also avoid any straining, stooping or squatting for 2 weeks. Do not drive for 1 week. If your groin site starts bleeding or oozing, apply firm pressure with a clean cloth and call [facility] at [phone]. You should watch for signs of infection such as increasing areas of redness, fever, or purulent drainage. If you see anything concerning, please call our office. If you experience any severe pain, numbness, color change or temperature change in your leg, please return to the Emergency Room for immediate evaluation. All patients with prosthetic valves, including those who have undergone TAVR, are considered among those at highest risk for endocarditis (infection of a heart valve). You may need to take antibiotics before certain procedures to prevent infection. Please call our office before you have any dental procedures or oral surgery.

## 2017-06-29 NOTE — PROGRESS NOTES
Upon initial assessment no skin breakdown noted under sacrum dressing. Multiple ecchymosis areas noted to bilateral legs and arms. Will continue to monitor.

## 2017-06-30 NOTE — PROGRESS NOTES
Transition of Care Discharge Follow-up Questionnaire   Date/Time of Call:   6/30/17 2:50p   What was the patient hospitalized for? S/P TAVR (transcatheter aortic valve replacement)              Does the patient understand his/her diagnosis and/or treatment and what happened during the hospitalization? Patient verbalizes that he understands his diagnosis. Did the patient receive discharge instructions? Yes     Review any discharge instructions (see notes in [facility]). Ask patient if they understand these. Do they have any questions? No questions in regards to discharge instructions. Were home services ordered (nursing, PT, OT, ST, etc.)? No     If so, has the first visit occurred? If not, why? (Assist with coordination of services if necessary.)     n/a     Was any DME ordered? No   If so, has it been received? If not, why?  (Assist with coordination of arranging DME orders if necessary.)   n/a       Complete a review of all medications (new, continued and discontinued meds per the D/C instructions and medication tab in [facility]). Per medication review with patient, he has not started new rx of lasix. Were all new prescriptions filled? If not, why?  (Assist with obtainment of medications if necessary.)   Patient will  medication today at pharmacy. Does the patient understand the purpose and dosing instructions for all medications? (If patient has questions, provide explanation and education.) Yes   Does the patient have any problems in performing ADLs? (If patient is unable to perform ADLs  what is the limiting factor(s)? Do they have a support system that can assist? If no support system is present, discuss possible assistance that they may be able to obtain.) Patient is independent with ADLs. He lives with his spouse. Does the patient have all follow-up appointments scheduled?       7 day f/up with PCP?    7-14 day f/up with specialist?    If f/up has not been made  what actions has the care coordinator made to accomplish this? Has transportation been arranged? [facility] follow-up should be within 7 days of discharge; all others should have PCP follow-up within 7 days of discharge; follow-ups with other specialists should be within 7-14 days of discharge.) Patient will get f/u scheduled with his PCP Dr. Lin dodge next week. Patient has a 7 day f/u with [facility] Cardiologist 7/7/17. Spouse provides transportation. Any other questions or concerns expressed by the patient? Patient has concerns about constipation. Patient has not had a BM in 4 days. Patient states he had to \"force it out today. \" Care coordinator encouraged patient to drink fluids (unless contraindicated). Patient is drinking prune juice, eating vegetables and drinking water. Care coordinator offered to contact Dr. Naty Palma on patient's behalf and schedule f/u appt for current issue. Patient and patient's spouse declined offer. Spouse states,  \"we will call on Monday if he hasn't had a bowel movement. \"  No other issues expressed by patient or spouse. Schedule next appointment with [facility] Coordinator or refer to RN Case Manager/  per the workflow guidelines. When is care coordinators next follow-up call scheduled? If referred for CCM  what RN care manager was the referral assigned? Follow-up call scheduled 7/14/17. NANCY Call Completed By: Aries Madera LPN Care Coordinator          This note will not be viewable in [address]

## 2017-07-07 PROBLEM — I50.22 CHRONIC SYSTOLIC CONGESTIVE HEART FAILURE (HCC): Status: ACTIVE | Noted: 2017-01-01

## 2017-07-07 PROBLEM — I44.7 LBBB (LEFT BUNDLE BRANCH BLOCK): Status: ACTIVE | Noted: 2017-01-01

## 2017-07-14 NOTE — PROGRESS NOTES
NANCY follow-up call:    Patient is \"feeling better, breathing better, and \"walking a lot better. \" Patient states he feels weak when getting up sometimes but is \"learning to get up slowly. \" Care coordinator explained the importance of rising up slowly in the morning. Patient does not have any new concerns or needs. He was thankful for call. This note will not be viewable in [address]

## 2017-07-27 NOTE — PROGRESS NOTES
NANCY follow-up call #3:    Patient states, \"he's been a good boy. \" Patient verbalizes his visits have gone well and he has gotten \"good reports. \" Patient does not verbalize any needs or concerns. He was thankful for follow-up. CC will close case. This note will not be viewable in [address]

## 2021-09-23 NOTE — ANESTHESIA PROCEDURE NOTES
Arterial Line Placement    Start time: 6/27/2017 10:27 AM  End time: 6/27/2017 10:30 AM  Performed by: Leah Chicas  Authorized by: Saniya COLLINS     Pre-Procedure  Indications:  Arterial pressure monitoring and blood sampling  Preanesthetic Checklist: patient identified, risks and benefits discussed, anesthesia consent, site marked, patient being monitored, timeout performed and patient being monitored    Timeout Time: 10:27        Procedure:   Prep:  Chlorhexidine  Seldinger Technique?: No    Orientation:  Right  Location:  Radial artery  Catheter size:  20 G  Number of attempts:  2  Cont Cardiac Output Sensor: No      Assessment:   Post-procedure:  Line secured and sterile dressing applied  Patient Tolerance:  Patient tolerated the procedure well with no immediate complications
PAST MEDICAL HISTORY:  Anemia of chronic disease     Asthma     Diabetes mellitus

## 2024-09-04 NOTE — PROGRESS NOTES
Bedside verbal report given to Bryan Barker Updated Dr Pruitt on pt's newest potassium of 4.4 and pt's preference to run in AM 9/5/24. Also notified primary nurse, Hallie BRADLEY.

## 2025-04-16 NOTE — PROGRESS NOTES
"Daily Note     Today's date: 2025  Patient name: Senthil Baker  : 1967  MRN: [id number]  Referring provider: Lc Gauthier DO  Dx:   Encounter Diagnosis     ICD-10-CM    1. Acute pain of right knee  M25.561       2. S/P total knee arthroplasty, right  Z96.651           Start Time: 1400  Stop Time: 1500  Total time in clinic (min): 60 minutes    Subjective: Patient reports he felt okay after the examination and he has been compliant with HEP      Objective: See treatment diary below      Assessment: Tolerated treatment well including initial exercise progressions and manual therapy. Improved knee flexion and extension ROM compared to examination last week however remains limited compared to baseline. Good weight distribution observed during mini squats. Patient demonstrated fatigue post treatment, exhibited good technique with therapeutic exercises, and would benefit from continued PT      Plan: Continue per plan of care.        Diagnosis: s/p R TKA 25   Precautions: DM, asthma, pacemaker, PE   Primary impairments: R knee AROM/PROM deficits, R knee strength deficits, and gait dysfunction   *asterisks by exercise = given for HEP          Manuals        R knee PROM and tibiofemoral mobilizations   15'                                      There Ex        Rec bike   1/2 revs x 8'      Heel slides *  5\" x 10  5\" x 10      Gastroc strap stretch        Supine knee ext prop stretch                                                Neuro Re-Ed        Quad sets *  5\" x 10  5\" x 10      SAQ  NV  5\" x 15      Supine SLR   X 10       Bridges        S/L hip abd        Prone hip ext        Heel/toe raises   X 15 ea      Mini squats   X 15      Band TKE   Blue 5\" x 10      U/L leg press        Fwd/lat step ups        Sidestepping and waddle walks                        Re-evaluation             Ther Act/Gait                                         Modalities             CP prn.   10'                        " Dual RN skin assessment completed. Patient's skin found to be warm,dry and intact. Noted scar and swelling on right knee. Patient has BUE scattered bruising. Sacrum and heels visualized with no noted skin breakdown. Patient's bilateral feet were purplish in color with cap refill less then 3 seconds and 1+ palpable pedal pulses. No other skin issues noted.

## (undated) DEVICE — 3000CC GUARDIAN II: Brand: GUARDIAN

## (undated) DEVICE — BLADE SAW W10XL54MM FOR PRI REPEAT STRNOTMY

## (undated) DEVICE — REM POLYHESIVE ADULT PATIENT RETURN ELECTRODE: Brand: VALLEYLAB

## (undated) DEVICE — BUTTON SWITCH PENCIL BLADE ELECTRODE, HOLSTER: Brand: EDGE

## (undated) DEVICE — SYR 50ML LR LCK 1ML GRAD NSAF --

## (undated) DEVICE — CATH URETH INTMIT ROB 14FR FUN -- USE ITEM 179521

## (undated) DEVICE — (D)PREP SKN CHLRAPRP APPL 26ML -- CONVERT TO ITEM 371833

## (undated) DEVICE — SUTURE VCRL SZ 3-0 L36IN ABSRB UD L36MM CT-1 1/2 CIR J944H

## (undated) DEVICE — GUIDEWIRE VASC L260CM DIA0.035IN TAPR L11CM FLPY TIP L4CM

## (undated) DEVICE — 3M™ TEGADERM™ TRANSPARENT FILM DRESSING FRAME STYLE, 1626W, 4 IN X 4-3/4 IN (10 CM X 12 CM), 50/CT 4CT/CASE: Brand: 3M™ TEGADERM™

## (undated) DEVICE — CATHETERIZATION TRAY FOL 16 FR 5 CC INF CTRL LUBRI-SIL IC

## (undated) DEVICE — SUTURE NONABSORBABLE MONOFILAMENT 5-0 C-1 1X24 IN PROLENE 8725H

## (undated) DEVICE — STOPCOCK ANGIO 1050PSI DK BLU L ROT M LUER 3 W OFF HNDL

## (undated) DEVICE — SOLUTION IV 1000ML 0.9% SOD CHL

## (undated) DEVICE — GLOVE SURG SZ 7 L11.2IN THK9.8MIL STRW LTX POLYMER BEAD CUF

## (undated) DEVICE — DRAPE SURG EQUIP DISC 66X44 -- USE ITEM 141557

## (undated) DEVICE — DRAPE SURG SPEC PROC 4 PC

## (undated) DEVICE — Device

## (undated) DEVICE — SOLUTION IRRIG 3000ML 0.9% SOD CHL FLX CONT 0797208] ICU MEDICAL INC]

## (undated) DEVICE — SUTURE VCRL SZ 4-0 L27IN ABSRB UD L19MM PS-2 3/8 CIR PRIM J426H

## (undated) DEVICE — AMD ANTIMICROBIAL GAUZE SPONGES,12 PLY USP TYPE VII, 0.2% POLYHEXAMETHYLENE BIGUANIDE HCI (PHMB): Brand: CURITY

## (undated) DEVICE — SUTURE ETHBND EXCEL SZ 0 L18IN NONABSORBABLE GRN L36MM CT-1 CX21D

## (undated) DEVICE — (D)STRIP SKN CLSR 0.5X4IN WHT --